# Patient Record
Sex: MALE | Race: WHITE | NOT HISPANIC OR LATINO | Employment: OTHER | URBAN - METROPOLITAN AREA
[De-identification: names, ages, dates, MRNs, and addresses within clinical notes are randomized per-mention and may not be internally consistent; named-entity substitution may affect disease eponyms.]

---

## 2021-05-16 ENCOUNTER — OFFICE VISIT (OUTPATIENT)
Dept: URGENT CARE | Facility: CLINIC | Age: 71
End: 2021-05-16
Payer: MEDICARE

## 2021-05-16 VITALS
TEMPERATURE: 98.3 F | WEIGHT: 231.4 LBS | HEIGHT: 75 IN | DIASTOLIC BLOOD PRESSURE: 82 MMHG | HEART RATE: 80 BPM | OXYGEN SATURATION: 96 % | RESPIRATION RATE: 16 BRPM | BODY MASS INDEX: 28.77 KG/M2 | SYSTOLIC BLOOD PRESSURE: 126 MMHG

## 2021-05-16 DIAGNOSIS — H66.002 NON-RECURRENT ACUTE SUPPURATIVE OTITIS MEDIA OF LEFT EAR WITHOUT SPONTANEOUS RUPTURE OF TYMPANIC MEMBRANE: ICD-10-CM

## 2021-05-16 PROCEDURE — 99204 OFFICE O/P NEW MOD 45 MIN: CPT | Performed by: NURSE PRACTITIONER

## 2021-05-16 RX ORDER — AMOXICILLIN AND CLAVULANATE POTASSIUM 875; 125 MG/1; MG/1
1 TABLET, FILM COATED ORAL 2 TIMES DAILY
Qty: 14 TABLET | Refills: 0 | Status: SHIPPED | OUTPATIENT
Start: 2021-05-16 | End: 2021-05-23

## 2021-05-16 ASSESSMENT — ENCOUNTER SYMPTOMS
DIARRHEA: 0
EYE PAIN: 0
SORE THROAT: 0
VERTIGO: 1
DOUBLE VISION: 0
EYE DISCHARGE: 0
FATIGUE: 1
PALPITATIONS: 0
DIZZINESS: 1
NAUSEA: 0
EYE REDNESS: 0
PHOTOPHOBIA: 0
FEVER: 0
CHILLS: 0
MYALGIAS: 0
HEADACHES: 0
ABDOMINAL PAIN: 0
VOMITING: 0
VISUAL CHANGE: 0
COUGH: 0
BLURRED VISION: 0

## 2021-05-16 NOTE — PROGRESS NOTES
Subjective:     Mariusz Jackson is a 71 y.o. male who presents for Dizziness (started this morning, loss of balance, light headed)      Dizziness  This is a new problem. The current episode started today (Mariusz is a pleasant 71 year old male who presents to  today with complaints of dizziness that started when he awoke this morning. His dizzyness has waxed and waned today. ). The problem occurs constantly. The problem has been unchanged. Associated symptoms include fatigue and vertigo. Pertinent negatives include no abdominal pain, chest pain, chills, congestion, coughing, fever, headaches, myalgias, nausea, sore throat, visual change or vomiting. Exacerbated by: position changes. Treatments tried: 2 Asprin. The treatment provided no relief.         Review of Systems   Constitutional: Positive for fatigue and malaise/fatigue. Negative for chills and fever.   HENT: Negative for congestion and sore throat.    Eyes: Negative for blurred vision, double vision, photophobia, pain, discharge and redness.   Respiratory: Negative for cough.    Cardiovascular: Negative for chest pain and palpitations.   Gastrointestinal: Negative for abdominal pain, diarrhea, nausea and vomiting.   Musculoskeletal: Negative for myalgias.   Neurological: Positive for dizziness and vertigo. Negative for headaches.   Endo/Heme/Allergies: Negative for environmental allergies.       PMH: No past medical history on file.  ALLERGIES: No Known Allergies  SURGHX: No past surgical history on file.  SOCHX:   Social History     Socioeconomic History   • Marital status:      Spouse name: Not on file   • Number of children: Not on file   • Years of education: Not on file   • Highest education level: Not on file   Occupational History   • Not on file   Tobacco Use   • Smoking status: Not on file   Substance and Sexual Activity   • Alcohol use: Not on file   • Drug use: Not on file   • Sexual activity: Not on file   Other Topics Concern   • Not on  "file   Social History Narrative   • Not on file     Social Determinants of Health     Financial Resource Strain:    • Difficulty of Paying Living Expenses:    Food Insecurity:    • Worried About Running Out of Food in the Last Year:    • Ran Out of Food in the Last Year:    Transportation Needs:    • Lack of Transportation (Medical):    • Lack of Transportation (Non-Medical):    Physical Activity:    • Days of Exercise per Week:    • Minutes of Exercise per Session:    Stress:    • Feeling of Stress :    Social Connections:    • Frequency of Communication with Friends and Family:    • Frequency of Social Gatherings with Friends and Family:    • Attends Restoration Services:    • Active Member of Clubs or Organizations:    • Attends Club or Organization Meetings:    • Marital Status:    Intimate Partner Violence:    • Fear of Current or Ex-Partner:    • Emotionally Abused:    • Physically Abused:    • Sexually Abused:      FH: No family history on file.      Objective:   /82 (BP Location: Left arm, Patient Position: Sitting, BP Cuff Size: Adult)   Pulse 80   Temp 36.8 °C (98.3 °F) (Temporal)   Resp 16   Ht 1.905 m (6' 3\")   Wt 105 kg (231 lb 6.4 oz)   SpO2 96%   BMI 28.92 kg/m²     Physical Exam  Vitals and nursing note reviewed.   Constitutional:       General: He is not in acute distress.     Appearance: Normal appearance. He is normal weight. He is not ill-appearing or toxic-appearing.   HENT:      Head: Normocephalic and atraumatic.      Right Ear: Hearing, tympanic membrane, ear canal and external ear normal. There is no impacted cerumen.      Left Ear: Hearing, ear canal and external ear normal. There is impacted cerumen. No mastoid tenderness. Tympanic membrane is erythematous and bulging.      Ears:      Comments: Cerumen removed via lavage by MA.  Internal ear visualized following removal.  TM injected and bulging.  He denies any pain associated with this left ear.      Nose: No congestion or " rhinorrhea.      Mouth/Throat:      Mouth: Mucous membranes are moist.      Pharynx: No oropharyngeal exudate or posterior oropharyngeal erythema.   Eyes:      Extraocular Movements: Extraocular movements intact.      Pupils: Pupils are equal, round, and reactive to light.   Cardiovascular:      Rate and Rhythm: Normal rate and regular rhythm.      Pulses: Normal pulses.      Heart sounds: Normal heart sounds.   Pulmonary:      Effort: Pulmonary effort is normal.      Breath sounds: Normal breath sounds.   Abdominal:      General: Abdomen is flat. Bowel sounds are normal.      Palpations: Abdomen is soft.      Tenderness: There is no abdominal tenderness. There is no right CVA tenderness or left CVA tenderness.   Musculoskeletal:         General: Normal range of motion.      Cervical back: Normal range of motion and neck supple. No tenderness.   Lymphadenopathy:      Cervical: No cervical adenopathy.   Skin:     General: Skin is warm and dry.      Capillary Refill: Capillary refill takes less than 2 seconds.   Neurological:      General: No focal deficit present.      Mental Status: He is alert and oriented to person, place, and time. Mental status is at baseline.   Psychiatric:         Mood and Affect: Mood normal.         Behavior: Behavior normal.         Thought Content: Thought content normal.         Judgment: Judgment normal.         Assessment/Plan:   Assessment    1. Non-recurrent acute suppurative otitis media of left ear without spontaneous rupture of tympanic membrane  amoxicillin-clavulanate (AUGMENTIN) 875-125 MG Tab     Although he denies any pain of his left ear, I believe that his dizziness is associated with middle ear infection.  He was prescribed Augmentin for this infection.  Patient to follow-up for worsening or persistent symptoms.   AVS handout given and reviewed with patient. Pt educated on red flags and when to seek treatment back in ER or UC.

## 2021-05-17 PROBLEM — M54.2 NECK PAIN: Status: ACTIVE | Noted: 2019-04-15

## 2021-05-17 PROBLEM — M79.672 FOOT PAIN, BILATERAL: Status: ACTIVE | Noted: 2017-07-17

## 2021-05-17 PROBLEM — L57.0 AK (ACTINIC KERATOSIS): Status: ACTIVE | Noted: 2020-02-12

## 2021-05-17 PROBLEM — M54.50 ACUTE LEFT-SIDED LOW BACK PAIN WITHOUT SCIATICA: Status: ACTIVE | Noted: 2019-06-19

## 2021-05-17 PROBLEM — M79.671 FOOT PAIN, BILATERAL: Status: ACTIVE | Noted: 2017-07-17

## 2021-08-25 ENCOUNTER — OFFICE VISIT (OUTPATIENT)
Dept: URGENT CARE | Facility: CLINIC | Age: 71
End: 2021-08-25
Payer: MEDICARE

## 2021-08-25 ENCOUNTER — HOSPITAL ENCOUNTER (OUTPATIENT)
Facility: MEDICAL CENTER | Age: 71
End: 2021-08-25
Attending: PHYSICIAN ASSISTANT
Payer: MEDICARE

## 2021-08-25 VITALS
RESPIRATION RATE: 14 BRPM | DIASTOLIC BLOOD PRESSURE: 62 MMHG | WEIGHT: 220 LBS | HEART RATE: 58 BPM | HEIGHT: 75 IN | SYSTOLIC BLOOD PRESSURE: 120 MMHG | TEMPERATURE: 97.7 F | BODY MASS INDEX: 27.35 KG/M2 | OXYGEN SATURATION: 96 %

## 2021-08-25 DIAGNOSIS — A09 TRAVELER'S DIARRHEA: ICD-10-CM

## 2021-08-25 LAB
C DIFF DNA SPEC QL NAA+PROBE: NEGATIVE
C DIFF TOX GENS STL QL NAA+PROBE: NEGATIVE

## 2021-08-25 PROCEDURE — 87177 OVA AND PARASITES SMEARS: CPT

## 2021-08-25 PROCEDURE — 87899 AGENT NOS ASSAY W/OPTIC: CPT

## 2021-08-25 PROCEDURE — 99213 OFFICE O/P EST LOW 20 MIN: CPT | Performed by: PHYSICIAN ASSISTANT

## 2021-08-25 PROCEDURE — 87045 FECES CULTURE AEROBIC BACT: CPT

## 2021-08-25 PROCEDURE — 87493 C DIFF AMPLIFIED PROBE: CPT

## 2021-08-25 PROCEDURE — 87209 SMEAR COMPLEX STAIN: CPT

## 2021-08-25 RX ORDER — ASPIRIN 81 MG/1
81 TABLET ORAL
COMMUNITY
End: 2021-08-25

## 2021-08-25 RX ORDER — AZITHROMYCIN 500 MG/1
500 TABLET, FILM COATED ORAL DAILY
Qty: 3 TABLET | Refills: 0 | Status: SHIPPED | OUTPATIENT
Start: 2021-08-25 | End: 2021-08-28

## 2021-08-25 RX ORDER — IBUPROFEN 200 MG
200 TABLET ORAL
COMMUNITY
End: 2021-08-25

## 2021-08-25 ASSESSMENT — ENCOUNTER SYMPTOMS
WEIGHT LOSS: 0
CHILLS: 0
BLOATING: 0
ABDOMINAL PAIN: 0
BLOOD IN STOOL: 0
DIARRHEA: 1
FEVER: 0
FLATUS: 0

## 2021-08-25 NOTE — PROGRESS NOTES
Subjective     Mariusz Jackson is a 71 y.o. male who presents with Diarrhea (x3-4wks. comes and goes, in last week, had been traveling to [place])    Medications:    • This patient does not have an active medication from one of the medication groupers.    Allergies: Patient has no known allergies.    Problem List: Mariusz Jackson does not have any pertinent problems on file.    Surgical History:  No past surgical history on file.    Past Social Hx: Mariusz Jackson       Past Family Hx:  Mariusz Jackson family history is not on file.     Problem list, medications, and allergies reviewed by myself today in Epic.           Patient presents with:  Diarrhea: x3-4wks. comes and goes, in last week, had been traveling to [place] from August 2-22.  Pt states no fever or bloody diarrhea.  Pt has tried some over-the-counter Imodium, as well as diet changes with no relief of the diarrhea.  Patient denies any other complaint.        Diarrhea   This is a new problem. The current episode started 1 to 4 weeks ago. The problem occurs 2 to 4 times per day. The problem has been waxing and waning. The stool consistency is described as watery. Pertinent negatives include no abdominal pain, bloating, chills, fever, increased  flatus or weight loss. Exacerbated by: all PO intake. Risk factors include travel to endemic area. He has tried bismuth subsalicylate, change of diet, increased fluids and electrolyte solution for the symptoms. The treatment provided no relief. There is no history of bowel resection, inflammatory bowel disease, irritable bowel syndrome, malabsorption, a recent abdominal surgery or short gut syndrome.       Review of Systems   Constitutional: Negative for chills, fever, malaise/fatigue and weight loss.   Gastrointestinal: Positive for diarrhea. Negative for abdominal pain, bloating, blood in stool and flatus.   Skin: Negative for rash.   All other systems reviewed and are negative.             Objective  "    /62 (BP Location: Left arm, Patient Position: Sitting, BP Cuff Size: Large adult)   Pulse (!) 58   Temp 36.5 °C (97.7 °F) (Temporal)   Resp 14   Ht 1.905 m (6' 3\")   Wt 99.8 kg (220 lb)   SpO2 96%   BMI 27.50 kg/m²      Physical Exam  Vitals and nursing note reviewed.   Constitutional:       General: He is not in acute distress.     Appearance: Normal appearance. He is well-developed and normal weight. He is not ill-appearing or toxic-appearing.   HENT:      Head: Normocephalic and atraumatic.      Right Ear: Tympanic membrane normal.      Left Ear: Tympanic membrane normal.      Nose: Nose normal.      Mouth/Throat:      Lips: Pink.      Mouth: Mucous membranes are moist.      Pharynx: Oropharynx is clear. Uvula midline.   Eyes:      Extraocular Movements: Extraocular movements intact.      Conjunctiva/sclera: Conjunctivae normal.      Pupils: Pupils are equal, round, and reactive to light.   Cardiovascular:      Rate and Rhythm: Normal rate and regular rhythm.      Pulses: Normal pulses.      Heart sounds: Normal heart sounds.   Pulmonary:      Effort: Pulmonary effort is normal.      Breath sounds: Normal breath sounds.   Abdominal:      General: Bowel sounds are normal.      Palpations: Abdomen is soft.   Musculoskeletal:         General: Normal range of motion.      Cervical back: Normal range of motion and neck supple.   Skin:     General: Skin is warm and dry.      Capillary Refill: Capillary refill takes less than 2 seconds.   Neurological:      General: No focal deficit present.      Mental Status: He is alert and oriented to person, place, and time.      Cranial Nerves: No cranial nerve deficit.      Motor: Motor function is intact.      Coordination: Coordination is intact.      Gait: Gait normal.   Psychiatric:         Mood and Affect: Mood normal.                   Assessment & Plan             1. Traveler's diarrhea  C Diff by PCR rflx Toxin    Complete O&P    CULTURE STOOL    " azithromycin (ZITHROMAX) 500 MG tablet     Patient was evaluated in clinic today while wearing appropriate personal protective equipment.      Patient has recently traveled to [place], diarrhea began after he started his travels.  Diarrhea is likely traveler's diarrhea which has persisted even on return.    Stool studies ordered, patient will give specimen to the lab prior to starting antibiotic treatment.    Culture sent to lab, will call with any necessary treatment or treatment changes.     PT should follow up with PCP in 1-2 days for re-evaluation if symptoms have not improved.      Discussed red flags and reasons to return to UC or ED.      Pt and/or family verbalized understanding of diagnosis and follow up instructions and was offered informational handout on diagnosis.  PT discharged.

## 2021-08-25 NOTE — PATIENT INSTRUCTIONS
"Travelers' Diarrhea  Travelers' diarrhea (TD) is the most common illness affecting travelers. Each year many travelers develop diarrhea. TD usually occurs within the first week of travel. However, it may occur at any time while traveling. It may even occur after returning home. The most important risk factor is where you are going. High-risk places are the developing countries of:  · [place].   · [place].   · [place].   · [place].   High risk people include young adults and those with:  · Transplants.   · HIV infections.   · Medicine that suppresses the immune system.   · Inflammatory-bowel disease.   · Diabetes.   · H-2 blockers or antacids.   Attack rates are similar for men and women. The primary source of TD is eating or drinking food or water tainted with feces (stool or bowel movements).  CAUSES   Infectious agents are the primary cause of TD. Germs cause almost 80% of TD cases. The most common germ produces:  · Watery diarrhea with cramps.   · Low-grade or no fever.   There are many other bacterial, viral and parasitic pathogens (disease causing \"bugs\").   SYMPTOMS   Most TD cases begin suddenly. Symptoms include stool that is increased in:  · Frequency.   · Volume.   · Weight.   Altered stool consistency also is common. Typically, you have four to five loose or watery bowel movements each day. Other common symptoms are:  · Nausea.   · Vomiting.   · Diarrhea.   · Abdominal cramping.   · Bloating.   · Fever.   · Urgency.   · Malaise.   Most cases are not dangerous. Most cases go away in 1-2 days without treatment. TD is rarely life threatening. 90% of cases resolve within 1 week. 98% resolve within 1 month.  PREVENTION   · Avoid foods or beverages purchased from street vendors in high risk countries.   · Avoid food from places where unclean conditions are present.   · Avoid raw or undercooked meat and seafood.   · Avoid raw fruits (e.g., oranges, bananas, avocados) and vegetables unless you peel them " yourself.   · If handled properly, well-cooked and packaged foods usually are safe. Foods associated with increased risk for TD include:   · Tap water.   · Ice.   · Unpasteurized milk.   · Dairy products.   · Safe beverages include:   · Bottled carbonated beverages.   · Hot tea or coffee.   · Beer.   · Wine.   · Boiled water.   · Water treated with iodine or chlorine.   ANTIBIOTICS ARE NOT RECOMMENDED AS PREVENTION  · CDC (Centers for Disease Control) does not recommend antimicrobial drugs (medicine that kill germs) to prevent TD. Several studies show that Pepto-Bismol® taken as either 2 tablets 4 times daily, or 2 fluid ounces 4 times daily, reduces the incidence of travelers' diarrhea. People that should avoid Pepto-Bismol® include those who are:   · Pregnant.   · Allergic to aspirin.   · Taking anticoagulants medicine (probenecid, methotrexate).   · Be informed about potential side effects, in particular about temporary blackening of the tongue and stool, and rarely ringing in the ears. Because of potential adverse side effects, preventative Pepto-Bismol® should not be used for more than 3 weeks.   · Some antibiotics taken in a once-a-day dose are 90% effective at preventing travelers' diarrhea. However, antibiotics are not recommended as prevention. Routine antimicrobial prophylaxis increases your risk for:   · Adverse reactions.   · Infections with resistant organisms.   · Antibiotics can increase your susceptibility to resistant bacterial pathogens and provide no protection against either viral or parasitic pathogens. This can give travelers a false sense of security. As a result, strict adherence to preventive measures is encouraged. Pepto-Bismol® should be used as an extra effort if prophylaxis is needed.   TREATMENT   · TD usually is a self-limited disorder. It gets well without treatment. It often goes away without specific treatment. Oral re-hydration is often helpful to replace lost fluids and  electrolytes. Clear liquids are routinely recommended for adults. You may be helped with antimicrobial therapy if you develop three or more loose stools in an 8-hour period, especially if associated with:   · Nausea.   · Vomiting.   · Abdominal cramps.   · Fever.   · Blood in stools.   · Antibiotics usually are given for 3-5 days. Pepto-Bismol® also may be used as treatment. Take one fluid ounce, or two 262 mg tablets every 30 minutes, for up to 8 doses in a 24-hour period. This can be repeated on a second day. If diarrhea persists despite therapy, you should be evaluated by a caregiver and treated for possible parasitic infection.   · Because drug resistance is a continuing problem and may vary from country to country, professional assistance should be looked for if problems persist.   · Antimotility agents (loperamide, diphenoxylate, and paregoric) mostly reduce diarrhea by slowing down the passage of food and drink in the gut. This allows more time for absorption. Some persons believe diarrhea is the body's defense mechanism to minimize contact time between gut pathogens and lining of the bowel. In several studies, antimotility agents have been useful in treating travelers' diarrhea by decreasing the duration of diarrhea. However, these agents should never be used by persons with fever or bloody diarrhea because they can increase the severity of disease by delaying clearance of causative organisms. Because antimotility agents are now available over the counter, their improper use is of concern. Complications have been reported from the use of these medicines such as:   · Toxic megacolon.   · Sepsis.   · Disseminated intravascular coagulation.   SEEK IMMEDIATE MEDICAL CARE IF:   · You are unable to keep fluids down.   · Vomiting or diarrhea becomes persistent.   · Abdominal (belly) pain develops or increases or localizes. (Right sided pain can be appendicitis and left sided pain in adults can be diverticulitis).    · You develop an oral temperature above 102° F (38.9° C), or as your caregiver suggests.   · Diarrhea becomes excessive or contains blood or mucous.   · Excessive weakness, dizziness, fainting or extreme thirst.   · Checking weight 2 to 3 times per day in babies and children will help verify adequate fluid replacement. Your caregiver will tell you what loss should concern you or suggest another visit to your personal physician.   · Record your weight or your child's weight today. Compare this to your home scale and record all weights and time and date weighed. Try to check weight at the same times every day. Bring this chart to your caregivers if you or your child needs to be seen again.   FOR MORE INFORMATION   Travelers should consult with a caregiver before departing on a trip abroad. Information about TD is available from:  · Your local or state health departments.   · World Health Organization (WHO).   Other information that may be of interest to travelers can be found at the CDC Travelers' Health homepage at http://www.cdc.gov/travel.  Document Released: 12/08/2003 Document Revised: 03/11/2013 Document Reviewed: 02/25/2010  [employer]Care® Patient Information ©2013 [employer].

## 2021-08-26 LAB
E COLI SXT1+2 STL IA: NORMAL
SIGNIFICANT IND 70042: NORMAL
SITE SITE: NORMAL
SOURCE SOURCE: NORMAL

## 2021-08-27 LAB
BACTERIA STL CULT: NORMAL
C JEJUNI+C COLI AG STL QL: NORMAL
E COLI SXT1+2 STL IA: NORMAL
SIGNIFICANT IND 70042: NORMAL
SITE SITE: NORMAL
SOURCE SOURCE: NORMAL

## 2021-08-30 LAB — OVA AND PARASITE, FECAL INTERPRETATION Q0595: NEGATIVE

## 2021-10-14 ENCOUNTER — TELEPHONE (OUTPATIENT)
Dept: SCHEDULING | Facility: IMAGING CENTER | Age: 71
End: 2021-10-14

## 2021-10-19 ENCOUNTER — OFFICE VISIT (OUTPATIENT)
Dept: MEDICAL GROUP | Facility: PHYSICIAN GROUP | Age: 71
End: 2021-10-19
Payer: MEDICARE

## 2021-10-19 VITALS
OXYGEN SATURATION: 96 % | TEMPERATURE: 98.8 F | RESPIRATION RATE: 16 BRPM | WEIGHT: 226 LBS | HEIGHT: 75 IN | HEART RATE: 60 BPM | SYSTOLIC BLOOD PRESSURE: 114 MMHG | BODY MASS INDEX: 28.1 KG/M2 | DIASTOLIC BLOOD PRESSURE: 70 MMHG

## 2021-10-19 DIAGNOSIS — Z23 NEED FOR VACCINATION: ICD-10-CM

## 2021-10-19 DIAGNOSIS — M79.672 FOOT PAIN, BILATERAL: ICD-10-CM

## 2021-10-19 DIAGNOSIS — M79.671 FOOT PAIN, BILATERAL: ICD-10-CM

## 2021-10-19 DIAGNOSIS — L57.0 AK (ACTINIC KERATOSIS): ICD-10-CM

## 2021-10-19 DIAGNOSIS — Z00.00 HEALTHCARE MAINTENANCE: ICD-10-CM

## 2021-10-19 PROBLEM — Z98.890 HISTORY OF LAMINECTOMY: Status: ACTIVE | Noted: 2021-10-19

## 2021-10-19 PROCEDURE — G0008 ADMIN INFLUENZA VIRUS VAC: HCPCS | Performed by: STUDENT IN AN ORGANIZED HEALTH CARE EDUCATION/TRAINING PROGRAM

## 2021-10-19 PROCEDURE — 99204 OFFICE O/P NEW MOD 45 MIN: CPT | Mod: 25 | Performed by: STUDENT IN AN ORGANIZED HEALTH CARE EDUCATION/TRAINING PROGRAM

## 2021-10-19 PROCEDURE — 90662 IIV NO PRSV INCREASED AG IM: CPT | Performed by: STUDENT IN AN ORGANIZED HEALTH CARE EDUCATION/TRAINING PROGRAM

## 2021-10-19 ASSESSMENT — PATIENT HEALTH QUESTIONNAIRE - PHQ9: CLINICAL INTERPRETATION OF PHQ2 SCORE: 0

## 2021-10-19 NOTE — LETTER
[facility]  Celio Santiago P.A.-C.  [address]  [place] [address]  Fax: [phone]   Authorization for Release/Disclosure of   Protected Health Information   Name: BASSAM TO : 1950 SSN: [id number]   Address: [facility] [address] [place] [address] Phone:    [phone] (home) [phone] (work)   I authorize the entity listed below to release/disclose the PHI below to:   [facility]/Celio Santiago P.A.-C. and Celio Santiago P.A.-C.   Provider or Entity Name:  [facility]   Address   City, [facility] [address], [facility], [place]  Phone:      Fax:     Reason for request: continuity of care   Information to be released:    [  ] LAST COLONOSCOPY,  including any PATH REPORT and follow-up  [  ] LAST FIT/COLOGUARD RESULT [  ] LAST DEXA  [  ] LAST MAMMOGRAM  [  ] LAST PAP  [  ] LAST LABS [  ] RETINA EXAM REPORT  [  ] IMMUNIZATION RECORDS  [XX] Release all info      [  ] Check here and initial the line next to each item to release ALL health information INCLUDING  _____ Care and treatment for drug and / or alcohol abuse  _____ HIV testing, infection status, or AIDS  _____ Genetic Testing    DATES OF SERVICE OR TIME PERIOD TO BE DISCLOSED: _____________  I understand and acknowledge that:  * This Authorization may be revoked at any time by you in writing, except if your health information has already been used or disclosed.  * Your health information that will be used or disclosed as a result of you signing this authorization could be re-disclosed by the recipient. If this occurs, your re-disclosed health information may no longer be protected by State or Federal laws.  * You may refuse to sign this Authorization. Your refusal will not affect your ability to obtain treatment.  * This Authorization becomes effective upon signing and will  on (date) __________.      If no date is indicated, this Authorization will  one (1) year from the signature date.    Name: Bassam  Abner Jackson    Signature:   Date:     10/19/2021       PLEASE FAX REQUESTED RECORDS BACK TO: [phone]

## 2021-10-19 NOTE — LETTER
[facility]  Celio Santiago P.A.-C.  [address]  [place] [address]  Fax: [phone]   Authorization for Release/Disclosure of   Protected Health Information   Name: BASSAM TO : 1950 SSN: [id number]   Address: [facility] [address] [place] [address] Phone:    [phone] (home) [phone] (work)   I authorize the entity listed below to release/disclose the PHI below to:   [facility]Celio Santiago P.A.-C. and Celio Santiago P.A.-C.   Provider or Entity Name:     Address   City, State, Zip   Phone:      Fax:     Reason for request: continuity of care   Information to be released:    [  ] LAST COLONOSCOPY,  including any PATH REPORT and follow-up  [  ] LAST FIT/COLOGUARD RESULT [  ] LAST DEXA  [  ] LAST MAMMOGRAM  [  ] LAST PAP  [  ] LAST LABS [  ] RETINA EXAM REPORT  [  ] IMMUNIZATION RECORDS  [  ] Release all info      [  ] Check here and initial the line next to each item to release ALL health information INCLUDING  _____ Care and treatment for drug and / or alcohol abuse  _____ HIV testing, infection status, or AIDS  _____ Genetic Testing    DATES OF SERVICE OR TIME PERIOD TO BE DISCLOSED: _____________  I understand and acknowledge that:  * This Authorization may be revoked at any time by you in writing, except if your health information has already been used or disclosed.  * Your health information that will be used or disclosed as a result of you signing this authorization could be re-disclosed by the recipient. If this occurs, your re-disclosed health information may no longer be protected by State or Federal laws.  * You may refuse to sign this Authorization. Your refusal will not affect your ability to obtain treatment.  * This Authorization becomes effective upon signing and will  on (date) __________.      If no date is indicated, this Authorization will  one (1) year from the signature date.    Name: Bassam To    Signature:   Date:      10/19/2021       PLEASE FAX REQUESTED RECORDS BACK TO: [phone]

## 2021-10-19 NOTE — LETTER
[facility]  Celio Santiago P.A.-C.  [address]  [place] [address]  Fax: [phone]   Authorization for Release/Disclosure of   Protected Health Information   Name: BASSAM TO : 1950 SSN: [id number]   Address[facility] [address] [place] [address] Phone:    [phone] (home) [phone] (work)   I authorize the entity listed below to release/disclose the PHI below to:   [facility]/Celio Santiago P.A.-C. and Celio Santiago P.A.-C.   Provider or Entity Name:     Address   City, State, Zip   Phone:      Fax:     Reason for request: continuity of care   Information to be released:    [  ] LAST COLONOSCOPY,  including any PATH REPORT and follow-up  [  ] LAST FIT/COLOGUARD RESULT [  ] LAST DEXA  [  ] LAST MAMMOGRAM  [  ] LAST PAP  [  ] LAST LABS [  ] RETINA EXAM REPORT  [  ] IMMUNIZATION RECORDS  [  ] Release all info      [  ] Check here and initial the line next to each item to release ALL health information INCLUDING  _____ Care and treatment for drug and / or alcohol abuse  _____ HIV testing, infection status, or AIDS  _____ Genetic Testing    DATES OF SERVICE OR TIME PERIOD TO BE DISCLOSED: _____________  I understand and acknowledge that:  * This Authorization may be revoked at any time by you in writing, except if your health information has already been used or disclosed.  * Your health information that will be used or disclosed as a result of you signing this authorization could be re-disclosed by the recipient. If this occurs, your re-disclosed health information may no longer be protected by State or Federal laws.  * You may refuse to sign this Authorization. Your refusal will not affect your ability to obtain treatment.  * This Authorization becomes effective upon signing and will  on (date) __________.      If no date is indicated, this Authorization will  one (1) year from the signature date.    Name: Bassam To    Signature:   Date:      10/19/2021       PLEASE FAX REQUESTED RECORDS BACK TO: [phone]

## 2021-10-19 NOTE — PROGRESS NOTES
"Subjective:     CC:  Diagnoses of Need for vaccination, AK (actinic keratosis), Foot pain, bilateral, and Healthcare maintenance were pertinent to this visit.    HISTORY OF THE PRESENT ILLNESS: Patient is a 71 y.o. male. This pleasant patient is here today to establish care. His prior PCP was  Kirill Harvey MD.    1.  Actinic keratosis  He was previously under care of dermatology for actinic plaques and concerns for skin cancer.  The patient notes that he comes from a very fair skinned family and there is a strong family history of skin cancer.    2.  Bilateral foot pain  Longstanding.  He has needed the assistance of podiatry in the past for surgical removal of corns.  He does have recurring corns that cause him pain with walking.    Active Diagnosis:    Patient Active Problem List   Diagnosis   • Acute left-sided low back pain without sciatica   • AK (actinic keratosis)   • Encounter for health-related screening   • Foot pain, bilateral   • Neck pain   • History of laminectomy      No current Epic-ordered outpatient medications on file.     No current Epic-ordered facility-administered medications on file.     ROS:   Gen: No fevers/chills, no changes in weight  HEENT: No changes in vision/hearing, sore throat.  Pulm: No cough, unexplained SOB.  CV: No chest pain/pressure, no palpitations  GI: No nausea/vomiting, no diarrhea  : No dysuria/nocturia  MSk: No myalgias  Skin: No rash/skin changes  Neuro: No headaches, no numbness/tingling  Heme/Lymph: no easy bruising      Objective:     Exam: /70 (BP Location: Left arm, Patient Position: Sitting, BP Cuff Size: Adult)   Pulse 60   Temp 37.1 °C (98.8 °F) (Temporal)   Resp 16   Ht 1.905 m (6' 3\")   Wt 103 kg (226 lb)   SpO2 96%  Body mass index is 28.25 kg/m².    General: Normal appearing. No distress.  HEENT: Normocephalic. Eyes conjunctiva clear lids without ptosis, pupils equal and reactive to light accommodation. Ears normal shape and contour, canals are " clear bilaterally, tympanic membranes are benign, nasal mucosa benign, oropharynx is without erythema, edema or exudates.   Neck: Supple without JVD or bruit. Thyroid is not enlarged.  Pulmonary: Clear to ausculation.  Normal effort. No rales, ronchi, or wheezing.  Cardiovascular: Regular rate and rhythm without murmur. Radial pulses are intact and equal bilaterally.  Abdomen: Soft, nontender, nondistended. Normal bowel sounds. Liver and spleen are not palpable  Neurologic: Grossly nonfocal.  CN II through XII intact.  Lymph: No cervical or supraclavicular lymph nodes are palpable  Skin: Warm and dry.  No obvious lesions.  Musculoskeletal: Normal gait. No extremity cyanosis, clubbing, or edema.  Psych: Normal mood and affect. Alert and oriented x3. Judgment and insight are normal.    A chaperone was offered to the patient during today's exam. Patient declined chaperone.    Labs:   No recent labs available.    Assessment & Plan:   71 y.o. male with the following -    1. Need for vaccination  - Provided in clinic today  - INFLUENZA VACCINE, HIGH DOSE (65+ ONLY)    2. AK (actinic keratosis)  - Chronic.  - REFERRAL TO DERMATOLOGY    3.  Bilateral foot pain  -Chronic, due to recurring corns.  -Refer to podiatry.    4.  Healthcare maintenance  -We discussed diet/exercise/healthy weight maintenance.  We discussed the need for biannual dentistry visits.  We discussed the need to always wear a seatbelt.  -CBC, CMP, hep C screen, lipid panel.      Return in about 6 months (around 4/19/2022) for Medicare Wellness.    Please note that this dictation was created using voice recognition software. I have made every reasonable attempt to correct obvious errors, but I expect that there are errors of grammar and possibly content that I did not discover before finalizing the note.      Celio Santiago PA-C 10/19/2021

## 2021-10-19 NOTE — LETTER
[facility]  Celio Santiago P.A.-C.  [address]  [place] [address]  Fax: [phone]   Authorization for Release/Disclosure of   Protected Health Information   Name: BASSAM TO : 1950 SSN: [id number]   Address: [facility] [address] [place] [address] Phone:    [phone] (home) [phone] (work)   I authorize the entity listed below to release/disclose the PHI below to:   [facility]/Celio Santiago P.A.-C. and Celio Santiago P.A.-C.   Provider or Entity Name:     Address   City, State, Zip   Phone:      Fax:     Reason for request: continuity of care   Information to be released:    [  ] LAST COLONOSCOPY,  including any PATH REPORT and follow-up  [  ] LAST FIT/COLOGUARD RESULT [  ] LAST DEXA  [  ] LAST MAMMOGRAM  [  ] LAST PAP  [  ] LAST LABS [  ] RETINA EXAM REPORT  [  ] IMMUNIZATION RECORDS  [  ] Release all info      [  ] Check here and initial the line next to each item to release ALL health information INCLUDING  _____ Care and treatment for drug and / or alcohol abuse  _____ HIV testing, infection status, or AIDS  _____ Genetic Testing    DATES OF SERVICE OR TIME PERIOD TO BE DISCLOSED: _____________  I understand and acknowledge that:  * This Authorization may be revoked at any time by you in writing, except if your health information has already been used or disclosed.  * Your health information that will be used or disclosed as a result of you signing this authorization could be re-disclosed by the recipient. If this occurs, your re-disclosed health information may no longer be protected by State or Federal laws.  * You may refuse to sign this Authorization. Your refusal will not affect your ability to obtain treatment.  * This Authorization becomes effective upon signing and will  on (date) __________.      If no date is indicated, this Authorization will  one (1) year from the signature date.    Name: Bassam To    Signature:   Date:      10/19/2021       PLEASE FAX REQUESTED RECORDS BACK TO: [phone]

## 2021-10-19 NOTE — LETTER
[facility]  Celio Santiago P.A.-C.  [address]  [place] [address]  Fax: [phone]   Authorization for Release/Disclosure of   Protected Health Information   Name: BASSAM TO : 1950 SSN: [id number]   Address: [facility] [address] [place] [address] Phone:    [phone] (home) [phone] (work)   I authorize the entity listed below to release/disclose the PHI below to:   [facility]/Celio Santiago P.A.-C. and Celio Santiago P.A.-C.   Provider or Entity Name:     Address   City, State, Zip   Phone:      Fax:     Reason for request: continuity of care   Information to be released:    [  ] LAST COLONOSCOPY,  including any PATH REPORT and follow-up  [  ] LAST FIT/COLOGUARD RESULT [  ] LAST DEXA  [  ] LAST MAMMOGRAM  [  ] LAST PAP  [  ] LAST LABS [  ] RETINA EXAM REPORT  [  ] IMMUNIZATION RECORDS  [  ] Release all info      [  ] Check here and initial the line next to each item to release ALL health information INCLUDING  _____ Care and treatment for drug and / or alcohol abuse  _____ HIV testing, infection status, or AIDS  _____ Genetic Testing    DATES OF SERVICE OR TIME PERIOD TO BE DISCLOSED: _____________  I understand and acknowledge that:  * This Authorization may be revoked at any time by you in writing, except if your health information has already been used or disclosed.  * Your health information that will be used or disclosed as a result of you signing this authorization could be re-disclosed by the recipient. If this occurs, your re-disclosed health information may no longer be protected by State or Federal laws.  * You may refuse to sign this Authorization. Your refusal will not affect your ability to obtain treatment.  * This Authorization becomes effective upon signing and will  on (date) __________.      If no date is indicated, this Authorization will  one (1) year from the signature date.    Name: Bassam To    Signature:   Date:      10/19/2021       PLEASE FAX REQUESTED RECORDS BACK TO: [phone]

## 2022-03-07 ENCOUNTER — HOSPITAL ENCOUNTER (OUTPATIENT)
Dept: LAB | Facility: MEDICAL CENTER | Age: 72
End: 2022-03-07
Attending: STUDENT IN AN ORGANIZED HEALTH CARE EDUCATION/TRAINING PROGRAM
Payer: COMMERCIAL

## 2022-03-07 ENCOUNTER — TELEPHONE (OUTPATIENT)
Dept: MEDICAL GROUP | Facility: PHYSICIAN GROUP | Age: 72
End: 2022-03-07

## 2022-03-07 DIAGNOSIS — Z00.00 HEALTHCARE MAINTENANCE: ICD-10-CM

## 2022-03-07 NOTE — TELEPHONE ENCOUNTER
VOICEMAIL  1. Caller Name: Mariusz Jackson                      Call Back Number: [phone]    2. Message: Pt left vm stating his lab tests ordered by PCP the codes were not covered by Medicare and pt is requesting PCP to change codes so he can get his labs completed     3. Patient approves office to leave a detailed voicemail/MyChart message: N\A

## 2022-03-09 ENCOUNTER — TELEPHONE (OUTPATIENT)
Dept: MEDICAL GROUP | Facility: PHYSICIAN GROUP | Age: 72
End: 2022-03-09
Payer: COMMERCIAL

## 2022-03-09 NOTE — TELEPHONE ENCOUNTER
This will have to wait for Celio's return. There are no open orders, so I don't know what labs to change ICD-10 codes on.

## 2022-03-17 DIAGNOSIS — Z11.59 ENCOUNTER FOR HEPATITIS C SCREENING TEST FOR LOW RISK PATIENT: ICD-10-CM

## 2022-03-17 DIAGNOSIS — Z13.220 SCREENING FOR HYPERCHOLESTEROLEMIA: ICD-10-CM

## 2022-03-17 DIAGNOSIS — Z00.00 HEALTHCARE MAINTENANCE: ICD-10-CM

## 2022-03-17 DIAGNOSIS — Z13.0 SCREENING FOR DEFICIENCY ANEMIA: ICD-10-CM

## 2022-03-17 DIAGNOSIS — Z13.1 SCREENING FOR DIABETES MELLITUS: ICD-10-CM

## 2022-03-17 DIAGNOSIS — Z13.9 ENCOUNTER FOR HEALTH-RELATED SCREENING: ICD-10-CM

## 2022-03-22 ENCOUNTER — HOSPITAL ENCOUNTER (OUTPATIENT)
Dept: LAB | Facility: MEDICAL CENTER | Age: 72
End: 2022-03-22
Attending: STUDENT IN AN ORGANIZED HEALTH CARE EDUCATION/TRAINING PROGRAM
Payer: MEDICARE

## 2022-03-22 DIAGNOSIS — Z13.1 SCREENING FOR DIABETES MELLITUS: ICD-10-CM

## 2022-03-22 DIAGNOSIS — Z13.9 ENCOUNTER FOR HEALTH-RELATED SCREENING: ICD-10-CM

## 2022-03-22 DIAGNOSIS — Z13.220 SCREENING FOR HYPERCHOLESTEROLEMIA: ICD-10-CM

## 2022-03-22 DIAGNOSIS — Z13.0 SCREENING FOR DEFICIENCY ANEMIA: ICD-10-CM

## 2022-03-22 DIAGNOSIS — Z00.00 HEALTHCARE MAINTENANCE: ICD-10-CM

## 2022-03-22 DIAGNOSIS — Z11.59 ENCOUNTER FOR HEPATITIS C SCREENING TEST FOR LOW RISK PATIENT: ICD-10-CM

## 2022-03-22 LAB
ALBUMIN SERPL BCP-MCNC: 4.6 G/DL (ref 3.2–4.9)
ALBUMIN/GLOB SERPL: 1.8 G/DL
ALP SERPL-CCNC: 53 U/L (ref 30–99)
ALT SERPL-CCNC: 12 U/L (ref 2–50)
ANION GAP SERPL CALC-SCNC: 12 MMOL/L (ref 7–16)
AST SERPL-CCNC: 20 U/L (ref 12–45)
BILIRUB SERPL-MCNC: 0.5 MG/DL (ref 0.1–1.5)
BUN SERPL-MCNC: 18 MG/DL (ref 8–22)
CALCIUM SERPL-MCNC: 9.4 MG/DL (ref 8.5–10.5)
CHLORIDE SERPL-SCNC: 102 MMOL/L (ref 96–112)
CHOLEST SERPL-MCNC: 216 MG/DL (ref 100–199)
CO2 SERPL-SCNC: 26 MMOL/L (ref 20–33)
CREAT SERPL-MCNC: 0.88 MG/DL (ref 0.5–1.4)
ERYTHROCYTE [DISTWIDTH] IN BLOOD BY AUTOMATED COUNT: 45.7 FL (ref 35.9–50)
FASTING STATUS PATIENT QL REPORTED: NORMAL
GFR SERPLBLD CREATININE-BSD FMLA CKD-EPI: 90 ML/MIN/1.73 M 2
GLOBULIN SER CALC-MCNC: 2.5 G/DL (ref 1.9–3.5)
GLUCOSE SERPL-MCNC: 94 MG/DL (ref 65–99)
HCT VFR BLD AUTO: 44.8 % (ref 42–52)
HCV AB SER QL: NORMAL
HDLC SERPL-MCNC: 71 MG/DL
HGB BLD-MCNC: 14.7 G/DL (ref 14–18)
LDLC SERPL CALC-MCNC: 128 MG/DL
MCH RBC QN AUTO: 32.1 PG (ref 27–33)
MCHC RBC AUTO-ENTMCNC: 32.8 G/DL (ref 33.7–35.3)
MCV RBC AUTO: 97.8 FL (ref 81.4–97.8)
PLATELET # BLD AUTO: 184 K/UL (ref 164–446)
PMV BLD AUTO: 10.5 FL (ref 9–12.9)
POTASSIUM SERPL-SCNC: 4.9 MMOL/L (ref 3.6–5.5)
PROT SERPL-MCNC: 7.1 G/DL (ref 6–8.2)
RBC # BLD AUTO: 4.58 M/UL (ref 4.7–6.1)
SODIUM SERPL-SCNC: 140 MMOL/L (ref 135–145)
TRIGL SERPL-MCNC: 87 MG/DL (ref 0–149)
WBC # BLD AUTO: 5.6 K/UL (ref 4.8–10.8)

## 2022-03-22 PROCEDURE — 36415 COLL VENOUS BLD VENIPUNCTURE: CPT | Mod: GA

## 2022-03-22 PROCEDURE — 80053 COMPREHEN METABOLIC PANEL: CPT

## 2022-03-22 PROCEDURE — 85027 COMPLETE CBC AUTOMATED: CPT | Mod: GA

## 2022-03-22 PROCEDURE — 80061 LIPID PANEL: CPT | Mod: GA

## 2022-03-22 PROCEDURE — G0472 HEP C SCREEN HIGH RISK/OTHER: HCPCS | Mod: GA

## 2022-04-16 SDOH — HEALTH STABILITY: PHYSICAL HEALTH: ON AVERAGE, HOW MANY DAYS PER WEEK DO YOU ENGAGE IN MODERATE TO STRENUOUS EXERCISE (LIKE A BRISK WALK)?: 3 DAYS

## 2022-04-16 SDOH — HEALTH STABILITY: MENTAL HEALTH
STRESS IS WHEN SOMEONE FEELS TENSE, NERVOUS, ANXIOUS, OR CAN'T SLEEP AT NIGHT BECAUSE THEIR MIND IS TROUBLED. HOW STRESSED ARE YOU?: TO SOME EXTENT

## 2022-04-16 SDOH — ECONOMIC STABILITY: FOOD INSECURITY: WITHIN THE PAST 12 MONTHS, YOU WORRIED THAT YOUR FOOD WOULD RUN OUT BEFORE YOU GOT MONEY TO BUY MORE.: NEVER TRUE

## 2022-04-16 SDOH — ECONOMIC STABILITY: TRANSPORTATION INSECURITY
IN THE PAST 12 MONTHS, HAS THE LACK OF TRANSPORTATION KEPT YOU FROM MEDICAL APPOINTMENTS OR FROM GETTING MEDICATIONS?: NO

## 2022-04-16 SDOH — ECONOMIC STABILITY: INCOME INSECURITY: IN THE LAST 12 MONTHS, WAS THERE A TIME WHEN YOU WERE NOT ABLE TO PAY THE MORTGAGE OR RENT ON TIME?: NO

## 2022-04-16 SDOH — ECONOMIC STABILITY: TRANSPORTATION INSECURITY
IN THE PAST 12 MONTHS, HAS LACK OF TRANSPORTATION KEPT YOU FROM MEETINGS, WORK, OR FROM GETTING THINGS NEEDED FOR DAILY LIVING?: NO

## 2022-04-16 SDOH — ECONOMIC STABILITY: HOUSING INSECURITY
IN THE LAST 12 MONTHS, WAS THERE A TIME WHEN YOU DID NOT HAVE A STEADY PLACE TO SLEEP OR SLEPT IN A SHELTER (INCLUDING NOW)?: NO

## 2022-04-16 SDOH — ECONOMIC STABILITY: FOOD INSECURITY: WITHIN THE PAST 12 MONTHS, THE FOOD YOU BOUGHT JUST DIDN'T LAST AND YOU DIDN'T HAVE MONEY TO GET MORE.: NEVER TRUE

## 2022-04-16 SDOH — ECONOMIC STABILITY: TRANSPORTATION INSECURITY
IN THE PAST 12 MONTHS, HAS LACK OF RELIABLE TRANSPORTATION KEPT YOU FROM MEDICAL APPOINTMENTS, MEETINGS, WORK OR FROM GETTING THINGS NEEDED FOR DAILY LIVING?: NO

## 2022-04-16 SDOH — ECONOMIC STABILITY: INCOME INSECURITY: HOW HARD IS IT FOR YOU TO PAY FOR THE VERY BASICS LIKE FOOD, HOUSING, MEDICAL CARE, AND HEATING?: NOT HARD AT ALL

## 2022-04-16 SDOH — ECONOMIC STABILITY: HOUSING INSECURITY: IN THE LAST 12 MONTHS, HOW MANY PLACES HAVE YOU LIVED?: 1

## 2022-04-16 SDOH — HEALTH STABILITY: PHYSICAL HEALTH: ON AVERAGE, HOW MANY MINUTES DO YOU ENGAGE IN EXERCISE AT THIS LEVEL?: 30 MIN

## 2022-04-16 ASSESSMENT — LIFESTYLE VARIABLES
HOW OFTEN DO YOU HAVE SIX OR MORE DRINKS ON ONE OCCASION: NEVER
HOW OFTEN DO YOU HAVE A DRINK CONTAINING ALCOHOL: 2-4 TIMES A MONTH
HOW MANY STANDARD DRINKS CONTAINING ALCOHOL DO YOU HAVE ON A TYPICAL DAY: 1 OR 2

## 2022-04-16 ASSESSMENT — SOCIAL DETERMINANTS OF HEALTH (SDOH)
HOW OFTEN DO YOU ATTENT MEETINGS OF THE CLUB OR ORGANIZATION YOU BELONG TO?: NEVER
IN A TYPICAL WEEK, HOW MANY TIMES DO YOU TALK ON THE PHONE WITH FAMILY, FRIENDS, OR NEIGHBORS?: MORE THAN THREE TIMES A WEEK
HOW OFTEN DO YOU HAVE A DRINK CONTAINING ALCOHOL: 2-4 TIMES A MONTH
HOW MANY DRINKS CONTAINING ALCOHOL DO YOU HAVE ON A TYPICAL DAY WHEN YOU ARE DRINKING: 1 OR 2
HOW OFTEN DO YOU GET TOGETHER WITH FRIENDS OR RELATIVES?: TWICE A WEEK
HOW OFTEN DO YOU GET TOGETHER WITH FRIENDS OR RELATIVES?: TWICE A WEEK
DO YOU BELONG TO ANY CLUBS OR ORGANIZATIONS SUCH AS CHURCH GROUPS UNIONS, FRATERNAL OR ATHLETIC GROUPS, OR SCHOOL GROUPS?: NO
IN A TYPICAL WEEK, HOW MANY TIMES DO YOU TALK ON THE PHONE WITH FAMILY, FRIENDS, OR NEIGHBORS?: MORE THAN THREE TIMES A WEEK
WITHIN THE PAST 12 MONTHS, YOU WORRIED THAT YOUR FOOD WOULD RUN OUT BEFORE YOU GOT THE MONEY TO BUY MORE: NEVER TRUE
HOW HARD IS IT FOR YOU TO PAY FOR THE VERY BASICS LIKE FOOD, HOUSING, MEDICAL CARE, AND HEATING?: NOT HARD AT ALL
HOW OFTEN DO YOU HAVE SIX OR MORE DRINKS ON ONE OCCASION: NEVER
HOW OFTEN DO YOU ATTEND CHURCH OR RELIGIOUS SERVICES?: NEVER
HOW OFTEN DO YOU ATTEND CHURCH OR RELIGIOUS SERVICES?: NEVER
DO YOU BELONG TO ANY CLUBS OR ORGANIZATIONS SUCH AS CHURCH GROUPS UNIONS, FRATERNAL OR ATHLETIC GROUPS, OR SCHOOL GROUPS?: NO
HOW OFTEN DO YOU ATTENT MEETINGS OF THE CLUB OR ORGANIZATION YOU BELONG TO?: NEVER

## 2022-04-19 ENCOUNTER — OFFICE VISIT (OUTPATIENT)
Dept: MEDICAL GROUP | Facility: PHYSICIAN GROUP | Age: 72
End: 2022-04-19
Payer: MEDICARE

## 2022-04-19 VITALS
RESPIRATION RATE: 16 BRPM | DIASTOLIC BLOOD PRESSURE: 66 MMHG | OXYGEN SATURATION: 95 % | HEART RATE: 75 BPM | BODY MASS INDEX: 28.55 KG/M2 | HEIGHT: 75 IN | SYSTOLIC BLOOD PRESSURE: 138 MMHG | WEIGHT: 229.6 LBS | TEMPERATURE: 97.8 F

## 2022-04-19 DIAGNOSIS — E78.00 HYPERCHOLESTEROLEMIA: ICD-10-CM

## 2022-04-19 DIAGNOSIS — N52.8 OTHER MALE ERECTILE DYSFUNCTION: ICD-10-CM

## 2022-04-19 PROCEDURE — G0439 PPPS, SUBSEQ VISIT: HCPCS | Performed by: STUDENT IN AN ORGANIZED HEALTH CARE EDUCATION/TRAINING PROGRAM

## 2022-04-19 RX ORDER — TADALAFIL 10 MG/1
10 TABLET ORAL PRN
Qty: 10 TABLET | Refills: 3 | Status: SHIPPED | OUTPATIENT
Start: 2022-04-19 | End: 2023-07-12

## 2022-04-19 ASSESSMENT — ENCOUNTER SYMPTOMS: GENERAL WELL-BEING: GOOD

## 2022-04-19 ASSESSMENT — ACTIVITIES OF DAILY LIVING (ADL): BATHING_REQUIRES_ASSISTANCE: 0

## 2022-04-19 ASSESSMENT — FIBROSIS 4 INDEX: FIB4 SCORE: 2.32

## 2022-04-19 ASSESSMENT — PATIENT HEALTH QUESTIONNAIRE - PHQ9: CLINICAL INTERPRETATION OF PHQ2 SCORE: 0

## 2022-04-19 NOTE — PROGRESS NOTES
Chief Complaint   Patient presents with   • Medicare Annual Wellness   Patient also complains of erectile dysfunction and is here for follow-up for elevated lipid profile.    HPI:  Mariusz is a 72 y.o. here for Medicare Annual Wellness Visit.    1.  Erectile dysfunction  Patient has a longstanding history of erectile dysfunction for which she has never sought any treatment.    2.  Hypercholesterolemia  This was discovered on the patient's last routine labs with an ASCVD score 17.7%.  He is here today to discuss his options.    Patient Active Problem List    Diagnosis Date Noted   • Other male erectile dysfunction 04/19/2022   • Hypercholesterolemia 04/19/2022   • History of laminectomy 10/19/2021   • AK (actinic keratosis) 02/12/2020   • Acute left-sided low back pain without sciatica 06/19/2019   • Neck pain 04/15/2019   • Foot pain, bilateral 07/17/2017   • Encounter for health-related screening 08/31/2016       Current Outpatient Medications   Medication Sig Dispense Refill   • tadalafil (CIALIS) 10 MG tablet Take 1 Tablet by mouth as needed for Erectile Dysfunction. 10 Tablet 3     No current facility-administered medications for this visit.      Patient is taking medications as noted in medication list.  Current supplements as per medication list.     Allergies: Patient has no known allergies.    Current social contact/activities:  Family, friends    Is patient current with immunizations? Yes.    He  reports that he has never smoked. He has never used smokeless tobacco. He reports current alcohol use. He reports that he does not use drugs.  Counseling given: Not Answered      DPA/Advanced directive: Patient does not have an Advanced Directive.  A packet and workshop information was given on Advanced Directives.    ROS:    Gait: Uses no assistive device   Ostomy: No   Other tubes: No   Amputations: No   Chronic oxygen use No   Last eye exam  4 yrs ago   Wears hearing aids: No   : Denies any urinary leakage during  the last 6 months  Pt reports frequent urination     Screening:    Depression Screening  Little interest or pleasure in doing things?  0 - not at allN  Feeling down, depressed, or hopeless? 0 - not at allN  Trouble falling or staying asleep, or sleeping too much?   N  Feeling tired or having little energy?   N  Poor appetite or overeating?   N  Feeling bad about yourself - or that you are a failure or have let yourself or your family down?  N  Trouble concentrating on things, such as reading the newspaper or watching television?  N  Moving or speaking so slowly that other people could have noticed.  Or the opposite - being so fidgety or restless that you have been moving around a lot more than usual?   N  Thoughts that you would be better off dead, or of hurting yourself?     Patient Health Questionnaire Score:  N    If depressive symptoms identified deferred to follow up visit unless specifically addressed in assessment and plan.    Interpretation of PHQ-9 Total Score   Score Severity   1-4 No Depression   5-9 Mild Depression   10-14 Moderate Depression   15-19 Moderately Severe Depression   20-27 Severe Depression      Screening for Cognitive Impairment  Three Minute Recall (daughter, heaven, galileo)  11/3    Draw clock face with all 12 numbers and set the hands to show 10 past 11.  Yes  Y  If cognitive concerns identified, deferred for follow up unless specifically addressed in assessment and plan.    Fall Risk Assessment  Has the patient had two or more falls in the last year or any fall with injury in the last year?  NoN  If fall risk identified, deferred for follow up unless specifically addressed in assessment and plan.    Safety Assessment  Throw rugs on floor.  [place]  Handrails on all stairs.  [place]  Good lighting in all hallways.  [place]  Difficulty hearing.  [place]  Patient counseled about all safety risks that were identified.    Functional Assessment ADLs  Are there any barriers preventing you from cooking  for yourself or meeting nutritional needs?  No.N    Are there any barriers preventing you from driving safely or obtaining transportation?  NoN.    Are there any barriers preventing you from using a telephone or calling for help?  No.N    Are there any barriers preventing you from shopping?  No.N    Are there any barriers preventing you from taking care of your own finances?  No.N    Are there any barriers preventing you from managing your medications?    No.N    Are there any barriers preventing you from showering, bathing or dressing yourself?  No.N    Are you currently engaging in any exercise or physical activity?  Yes.Y     What is your perception of your health?  Good.Good    Health Maintenance Summary          IMM DTaP/Tdap/Td Vaccine (2 - Td or Tdap) Next due on 11/13/2025 11/13/2015  Imm Admin: Tdap Vaccine          COLORECTAL CANCER SCREENING (COLONOSCOPY - Every 10 Years) Next due on 10/19/2031    10/19/2021  COLONOSCOPY (Done)          IMM PNEUMOCOCCAL VACCINE: 65+ Years (Series Information) Completed    08/13/2020  Imm Admin: Pneumococcal polysaccharide vaccine (PPSV-23)    11/13/2015  Imm Admin: Pneumococcal Conjugate Vaccine (Prevnar/PCV-13)          IMM ZOSTER VACCINES (Series Information) Completed    03/30/2021  Imm Admin: Zoster Vaccine Recombinant (RZV) (SHINGRIX)    08/13/2020  Imm Admin: Zoster Vaccine Recombinant (RZV) (SHINGRIX)          IMM INFLUENZA (Series Information) Completed    10/19/2021  Imm Admin: Influenza Vaccine Adult HD    10/05/2020  Imm Admin: Influenza Vaccine Adult HD    10/27/2019  Imm Admin: Influenza Vaccine Adult HD    10/18/2018  Imm Admin: Influenza Vaccine Adult HD    11/07/2017  Imm Admin: Influenza Vaccine Adult HD    Only the first 5 history entries have been loaded, but more history exists.          COVID-19 Vaccine (Series Information) Completed    11/09/2021  Imm Admin: Moderna SARS-CoV-2 Vaccine    03/09/2021  Imm Admin: Moderna SARS-CoV-2 Vaccine     "2021  Imm Admin: Moderna SARS-CoV-2 Vaccine          HEPATITIS C SCREENING  Completed    2022  HCV Scrn ( 2040-2312 1xLife)          Annual Wellness Visit  Completed    2022  Level of Service: ANNUAL WELLNESS VISIT-INCLUDES PPPS SUBSEQUE*          IMM HEP B VACCINE (Series Information) Aged Out    No completion history exists for this topic.          IMM MENINGOCOCCAL VACCINE (MCV4) (Series Information) Aged Out    No completion history exists for this topic.                Patient Care Team:  Celio Santiago P.A.-C. as PCP - General (Physician Assistant)    Social History     Tobacco Use   • Smoking status: Never Smoker   • Smokeless tobacco: Never Used   Vaping Use   • Vaping Use: Never used   Substance Use Topics   • Alcohol use: Yes     Comment: 1 drink per week    • Drug use: Never     No family history on file.  He  has no past medical history on file.   No past surgical history on file.      Exam:   /66 (BP Location: Left arm, Patient Position: Sitting, BP Cuff Size: Adult)   Pulse 75   Temp 36.6 °C (97.8 °F) (Temporal)   Resp 16   Ht 1.905 m (6' 3\")   Wt 104 kg (229 lb 9.6 oz)   SpO2 95%  Body mass index is 28.7 kg/m².    Hearing good.    Dentition fair  Alert, oriented in no acute distress  Eye contact is good, speech goal directed, affect calm.      Assessment and Plan. The following treatment and monitoring plan is recommended:      1.  Erectile dysfunction  -Chronic, untreated.  -Tadalafil 10 mg to be taken once daily as needed for sexual intercourse.  Dispense 10.  Refill 3.    2.  Hypercholesterolemia  -Chronic, untreated.  -I have discussed the patient's options and he wishes to think on this before proceeding with any treatment.  I did give him an opportunity to ask questions about side effects of medication and consequences of nontreatment.  He will contact me when he has made a decision.      Services suggested: No services needed at this time  Health Care " Screening recommendations as per orders if indicated.  Referrals offered: PT/OT/Nutrition counseling/Behavioral Health/Smoking cessation as per orders if indicated.    Discussion today about general wellness and lifestyle habits:    · Prevent falls and reduce trip hazards; Cautioned about securing or removing rugs.  · Have a working fire alarm and carbon monoxide detector;   · Engage in regular physical activity and social activities.     Follow-up: No follow-ups on file.

## 2022-11-10 ENCOUNTER — PATIENT MESSAGE (OUTPATIENT)
Dept: HEALTH INFORMATION MANAGEMENT | Facility: OTHER | Age: 72
End: 2022-11-10

## 2022-12-19 ENCOUNTER — TELEPHONE (OUTPATIENT)
Dept: MEDICAL GROUP | Facility: PHYSICIAN GROUP | Age: 72
End: 2022-12-19
Payer: MEDICARE

## 2022-12-19 NOTE — TELEPHONE ENCOUNTER
VOICEMAIL  1. Caller Name: Mariusz Jackson                      Call Back Number: [phone]     2. Message: patient left  stating he has received conflicting COVID results and would like advise.     3. Patient approves office to leave a detailed voicemail/MyChart message: N\A      Phone Number Called: [phone]     Call outcome: Spoke to patient regarding message below.    Message: Spoke to patient, patient states he is on vacation, last Monday sore throat, progressed to cold like symptoms- [facility] and COVID negative antigen, positive on PCR test. Patient does not know what the next steps are.

## 2023-05-23 SDOH — ECONOMIC STABILITY: FOOD INSECURITY: WITHIN THE PAST 12 MONTHS, THE FOOD YOU BOUGHT JUST DIDN'T LAST AND YOU DIDN'T HAVE MONEY TO GET MORE.: NEVER TRUE

## 2023-05-23 SDOH — ECONOMIC STABILITY: FOOD INSECURITY: WITHIN THE PAST 12 MONTHS, YOU WORRIED THAT YOUR FOOD WOULD RUN OUT BEFORE YOU GOT MONEY TO BUY MORE.: NEVER TRUE

## 2023-05-23 SDOH — ECONOMIC STABILITY: INCOME INSECURITY: IN THE LAST 12 MONTHS, WAS THERE A TIME WHEN YOU WERE NOT ABLE TO PAY THE MORTGAGE OR RENT ON TIME?: NO

## 2023-05-23 SDOH — ECONOMIC STABILITY: HOUSING INSECURITY: IN THE LAST 12 MONTHS, HOW MANY PLACES HAVE YOU LIVED?: 1

## 2023-05-23 SDOH — HEALTH STABILITY: PHYSICAL HEALTH: ON AVERAGE, HOW MANY DAYS PER WEEK DO YOU ENGAGE IN MODERATE TO STRENUOUS EXERCISE (LIKE A BRISK WALK)?: 4 DAYS

## 2023-05-23 SDOH — ECONOMIC STABILITY: INCOME INSECURITY: HOW HARD IS IT FOR YOU TO PAY FOR THE VERY BASICS LIKE FOOD, HOUSING, MEDICAL CARE, AND HEATING?: NOT HARD AT ALL

## 2023-05-23 SDOH — HEALTH STABILITY: PHYSICAL HEALTH: ON AVERAGE, HOW MANY MINUTES DO YOU ENGAGE IN EXERCISE AT THIS LEVEL?: 130 MIN

## 2023-05-23 ASSESSMENT — LIFESTYLE VARIABLES
AUDIT-C TOTAL SCORE: 2
HOW MANY STANDARD DRINKS CONTAINING ALCOHOL DO YOU HAVE ON A TYPICAL DAY: 1 OR 2
SKIP TO QUESTIONS 9-10: 1
HOW OFTEN DO YOU HAVE A DRINK CONTAINING ALCOHOL: 2-4 TIMES A MONTH
HOW OFTEN DO YOU HAVE SIX OR MORE DRINKS ON ONE OCCASION: NEVER

## 2023-05-23 ASSESSMENT — SOCIAL DETERMINANTS OF HEALTH (SDOH)
IN A TYPICAL WEEK, HOW MANY TIMES DO YOU TALK ON THE PHONE WITH FAMILY, FRIENDS, OR NEIGHBORS?: MORE THAN THREE TIMES A WEEK
HOW OFTEN DO YOU GET TOGETHER WITH FRIENDS OR RELATIVES?: ONCE A WEEK
IN A TYPICAL WEEK, HOW MANY TIMES DO YOU TALK ON THE PHONE WITH FAMILY, FRIENDS, OR NEIGHBORS?: MORE THAN THREE TIMES A WEEK
HOW OFTEN DO YOU HAVE A DRINK CONTAINING ALCOHOL: 2-4 TIMES A MONTH
HOW OFTEN DO YOU ATTEND CHURCH OR RELIGIOUS SERVICES?: NEVER
HOW HARD IS IT FOR YOU TO PAY FOR THE VERY BASICS LIKE FOOD, HOUSING, MEDICAL CARE, AND HEATING?: NOT HARD AT ALL
HOW MANY DRINKS CONTAINING ALCOHOL DO YOU HAVE ON A TYPICAL DAY WHEN YOU ARE DRINKING: 1 OR 2
HOW OFTEN DO YOU HAVE SIX OR MORE DRINKS ON ONE OCCASION: NEVER
HOW OFTEN DO YOU ATTENT MEETINGS OF THE CLUB OR ORGANIZATION YOU BELONG TO?: MORE THAN 4 TIMES PER YEAR
HOW OFTEN DO YOU GET TOGETHER WITH FRIENDS OR RELATIVES?: ONCE A WEEK
HOW OFTEN DO YOU ATTENT MEETINGS OF THE CLUB OR ORGANIZATION YOU BELONG TO?: MORE THAN 4 TIMES PER YEAR
DO YOU BELONG TO ANY CLUBS OR ORGANIZATIONS SUCH AS CHURCH GROUPS UNIONS, FRATERNAL OR ATHLETIC GROUPS, OR SCHOOL GROUPS?: YES
WITHIN THE PAST 12 MONTHS, YOU WORRIED THAT YOUR FOOD WOULD RUN OUT BEFORE YOU GOT THE MONEY TO BUY MORE: NEVER TRUE
DO YOU BELONG TO ANY CLUBS OR ORGANIZATIONS SUCH AS CHURCH GROUPS UNIONS, FRATERNAL OR ATHLETIC GROUPS, OR SCHOOL GROUPS?: YES
HOW OFTEN DO YOU ATTEND CHURCH OR RELIGIOUS SERVICES?: NEVER

## 2023-05-24 ENCOUNTER — OFFICE VISIT (OUTPATIENT)
Dept: MEDICAL GROUP | Facility: PHYSICIAN GROUP | Age: 73
End: 2023-05-24
Payer: MEDICARE

## 2023-05-24 VITALS
RESPIRATION RATE: 16 BRPM | DIASTOLIC BLOOD PRESSURE: 66 MMHG | HEART RATE: 66 BPM | HEIGHT: 75 IN | BODY MASS INDEX: 28.57 KG/M2 | WEIGHT: 229.8 LBS | OXYGEN SATURATION: 94 % | TEMPERATURE: 98.4 F | SYSTOLIC BLOOD PRESSURE: 124 MMHG

## 2023-05-24 DIAGNOSIS — M25.562 ACUTE PAIN OF BOTH KNEES: ICD-10-CM

## 2023-05-24 DIAGNOSIS — M25.561 ACUTE PAIN OF BOTH KNEES: ICD-10-CM

## 2023-05-24 DIAGNOSIS — R20.0 BILATERAL HAND NUMBNESS: ICD-10-CM

## 2023-05-24 PROCEDURE — 99213 OFFICE O/P EST LOW 20 MIN: CPT | Performed by: STUDENT IN AN ORGANIZED HEALTH CARE EDUCATION/TRAINING PROGRAM

## 2023-05-24 PROCEDURE — 3074F SYST BP LT 130 MM HG: CPT | Performed by: STUDENT IN AN ORGANIZED HEALTH CARE EDUCATION/TRAINING PROGRAM

## 2023-05-24 PROCEDURE — 3078F DIAST BP <80 MM HG: CPT | Performed by: STUDENT IN AN ORGANIZED HEALTH CARE EDUCATION/TRAINING PROGRAM

## 2023-05-24 RX ORDER — MELOXICAM 7.5 MG/1
7.5 TABLET ORAL DAILY
Qty: 30 TABLET | Refills: 0 | Status: SHIPPED | OUTPATIENT
Start: 2023-05-24 | End: 2023-07-12

## 2023-05-24 ASSESSMENT — FIBROSIS 4 INDEX: FIB4 SCORE: 2.35

## 2023-05-24 ASSESSMENT — PATIENT HEALTH QUESTIONNAIRE - PHQ9: CLINICAL INTERPRETATION OF PHQ2 SCORE: 0

## 2023-05-24 NOTE — PROGRESS NOTES
CC:  Diagnoses of Acute pain of both knees and Bilateral hand numbness were pertinent to this visit.    HISTORY OF THE PRESENT ILLNESS: Patient is a 73 y.o. male. This pleasant patient is here today to discuss:    1. Acute pain of both knees  Approximately 3 months ago the patient took a misstep and twisted his right knee little bit.  This gave him some grief for a while and eventually not only was this knee hurting but his left knee began hurting as well, he feels this may be due to some gait compensation from the right knee pain.  He finds that his symptoms are worse when moving from sitting to standing but once he is walking he feels quite well.  He feels overall the symptoms are getting better.  He has been treating with some glucosamine, no NSAIDs.  He does not report any visible swelling or heat.    2. Bilateral hand numbness  This began approximately 1 or more years ago.  Occasionally he will wake up in the very early morning hours with 1 or both hands numb and painful.  This resolves in short order without any specific treatment.  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  He reports occasionally similar symptoms when driving for a long period of time with his hands up on the steering wheel or sometimes when using gardening tools for an extended period.    Active Diagnosis:    Patient Active Problem List   Diagnosis    Acute left-sided low back pain without sciatica    AK (actinic keratosis)    Encounter for health-related screening    Foot pain, bilateral    Neck pain    History of laminectomy    Other male erectile dysfunction    Hypercholesterolemia    Acute pain of both knees    Bilateral hand numbness      Current Outpatient Medications Ordered in Epic   Medication Sig Dispense Refill    meloxicam (MOBIC) 7.5 MG Tab Take 1 Tablet by mouth every day. Do not take concurrently with Ibuprofen or Naproxen. 30 Tablet 0    tadalafil (CIALIS) 10 MG tablet Take 1 Tablet by mouth as needed for Erectile  "Dysfunction. 10 Tablet 3     No current Epic-ordered facility-administered medications on file.     ROS:   See HPI.    Objective:     Exam: /66 (BP Location: Left arm, Patient Position: Sitting, BP Cuff Size: Adult)   Pulse 66   Temp 36.9 °C (98.4 °F) (Temporal)   Resp 16   Ht 1.905 m (6' 3\")   Wt 104 kg (229 lb 12.8 oz)   SpO2 94%  Body mass index is 28.72 kg/m².    General: Normal appearing. No distress.  neurologic: Grossly nonfocal.  Negative Phalen test.  Negative Tinel about the ulnar notch and the carpal tunnel.  Skin: Warm and dry.  No obvious lesions.  Musculoskeletal: No extremity cyanosis, clubbing, or edema.  Bilateral knee exam: Slight swelling of the right knee versus left.  Slight increase in tension along the right joint line suggesting modest effusion.  No obvious heat, erythema, deformity.  No tenderness to patellar pressure.  No popliteal abnormalities.  Negative anterior/posterior drawer test.  Intact MCL/PCL bilaterally.  Normal gait.  Normal active ROM.              Assessment & Plan:   73 y.o. male with the following -    Labs:   3/22/2022:  -CMP, WNL    1. Acute pain of both knees  -Acute uncomplicated injury.  -Likely the result of the patient's misstep and subsequent compensation.  -Trial conservative therapy with nonsteroidal anti-inflammatory for 1 month.  Time and consider physical therapy and/or imaging if conservative therapy fails.  - meloxicam (MOBIC) 7.5 MG Tab; Take 1 Tablet by mouth every day. Do not take concurrently with Ibuprofen or Naproxen.  Dispense: 30 Tablet; Refill: 0    2. Bilateral hand numbness  -Chronic.  -Despite negative Tinel and Phalen test the patient's symptoms very closely fit that of median nerve entrapment secondary to nocturnal flexing on.  -Bilateral night braces recommended.  OTC.    Return in about 6 weeks (around 7/5/2023).  Follow-up on #1 and 2 above.    Please note that this dictation was created using voice recognition software. I have " made every reasonable attempt to correct obvious errors, but I expect that there are errors of grammar and possibly content that I did not discover before finalizing the note.      Celio Santiago PA-C 5/24/2023

## 2023-07-12 ENCOUNTER — OFFICE VISIT (OUTPATIENT)
Dept: MEDICAL GROUP | Facility: PHYSICIAN GROUP | Age: 73
End: 2023-07-12
Payer: MEDICARE

## 2023-07-12 VITALS
TEMPERATURE: 97.6 F | SYSTOLIC BLOOD PRESSURE: 124 MMHG | WEIGHT: 228.6 LBS | OXYGEN SATURATION: 97 % | HEIGHT: 75 IN | DIASTOLIC BLOOD PRESSURE: 62 MMHG | HEART RATE: 66 BPM | BODY MASS INDEX: 28.42 KG/M2

## 2023-07-12 DIAGNOSIS — M25.561 ACUTE PAIN OF BOTH KNEES: ICD-10-CM

## 2023-07-12 DIAGNOSIS — R20.0 BILATERAL HAND NUMBNESS: ICD-10-CM

## 2023-07-12 DIAGNOSIS — M25.562 ACUTE PAIN OF BOTH KNEES: ICD-10-CM

## 2023-07-12 PROCEDURE — 3074F SYST BP LT 130 MM HG: CPT | Performed by: STUDENT IN AN ORGANIZED HEALTH CARE EDUCATION/TRAINING PROGRAM

## 2023-07-12 PROCEDURE — 3078F DIAST BP <80 MM HG: CPT | Performed by: STUDENT IN AN ORGANIZED HEALTH CARE EDUCATION/TRAINING PROGRAM

## 2023-07-12 PROCEDURE — 99213 OFFICE O/P EST LOW 20 MIN: CPT | Performed by: STUDENT IN AN ORGANIZED HEALTH CARE EDUCATION/TRAINING PROGRAM

## 2023-07-12 ASSESSMENT — FIBROSIS 4 INDEX: FIB4 SCORE: 2.35

## 2023-07-12 NOTE — PROGRESS NOTES
"CC:  Diagnoses of Acute pain of both knees and Bilateral hand numbness were pertinent to this visit.    HISTORY OF THE PRESENT ILLNESS: Patient is a 73 y.o. male. This pleasant patient is here today to discuss:    1. Acute pain of both knees  Patient feels that both knees are slowly improving.  He still senses some degree of bilateral instability.  No swelling, heat, erythema.  He did not feel the meloxicam offered him much benefit.    2. Bilateral hand numbness  Patient has been using a wrist wrap rather than a wrist brace and has noticed significant improvement in his nocturnal symptoms.  He still has occasional numbness of the hands.    Active Diagnosis:    Patient Active Problem List   Diagnosis    Acute left-sided low back pain without sciatica    AK (actinic keratosis)    Encounter for health-related screening    Foot pain, bilateral    Neck pain    History of laminectomy    Other male erectile dysfunction    Hypercholesterolemia    Acute pain of both knees    Bilateral hand numbness      No current Epic-ordered outpatient medications on file.     No current [facility]-ordered facility-administered medications on file.     ROS:   See HPI.    Objective:     Exam: /62 (BP Location: Left arm, Patient Position: Sitting, BP Cuff Size: Adult)   Pulse 66   Temp 36.4 °C (97.6 °F) (Temporal)   Ht 1.905 m (6' 3\")   Wt 104 kg (228 lb 9.6 oz)   SpO2 97%  Body mass index is 28.57 kg/m².    General: Normal appearing. No distress.  neurologic: Grossly nonfocal.    Skin: Warm and dry.  No obvious lesions.  Musculoskeletal: Normal gait.  No extremity cyanosis, clubbing, or edema.  Psych: Normal mood and affect.             Assessment & Plan:   73 y.o. male with the following -    Labs:   No interval labs.    1. Acute pain of both knees  -Acute uncomplicated illness.  -DC meloxicam.  - Referral to Physical Therapy to see if we can get this patient a little bit more stability and comfort of the knee.    2. Bilateral hand " numbness  -Chronic with improvement in symptoms after use of wrist wraps.  -Recommend a more rigid carpal tunnel brace to be used bilaterally each night for further improvement of symptoms.    Return if symptoms worsen or fail to improve.  Schedule an appointment if no significant improvement of the knees after 6 weeks of intervention.    Please note that this dictation was created using voice recognition software. I have made every reasonable attempt to correct obvious errors, but I expect that there are errors of grammar and possibly content that I did not discover before finalizing the note.      Celio Santiago PA-C 7/12/2023

## 2023-07-27 ENCOUNTER — PHYSICAL THERAPY (OUTPATIENT)
Dept: PHYSICAL THERAPY | Facility: REHABILITATION | Age: 73
End: 2023-07-27
Attending: STUDENT IN AN ORGANIZED HEALTH CARE EDUCATION/TRAINING PROGRAM
Payer: MEDICARE

## 2023-07-27 DIAGNOSIS — M25.561 ACUTE PAIN OF BOTH KNEES: ICD-10-CM

## 2023-07-27 DIAGNOSIS — M25.562 ACUTE PAIN OF BOTH KNEES: ICD-10-CM

## 2023-07-27 PROCEDURE — 97110 THERAPEUTIC EXERCISES: CPT

## 2023-07-27 PROCEDURE — 97162 PT EVAL MOD COMPLEX 30 MIN: CPT

## 2023-07-27 NOTE — OP THERAPY EVALUATION
Outpatient Physical Therapy  INITIAL EVALUATION    [facility] [place]  [address], [address]  [place] [address]  Phone:  [phone]    Date of Evaluation: 07/27/2023    Patient: Mariusz Jackson  YOB: 1950  MRN: [id number]     Referring Provider: Celio Santiago P.A.-C.  [address]  [place] [address]   Referring Diagnosis Acute pain of both knees [M25.561, M25.562]     Time Calculation  Start time: 0130  Stop time: 0215 Time Calculation (min): 45 minutes         Chief Complaint: No chief complaint on file.    Visit Diagnoses     ICD-10-CM   1. Acute pain of both knees  M25.561    M25.562       Date of onset of impairment: No data found    Subjective:   History of Present Illness:     Mechanism of injury:  CMHx: Pt indicates that he took a misstep off a curb on his R knee a few mo ago; he noted that he then compensated and developed L knee pain. He got a lot better initially to where he could walk without a lot of issues but now he notes that he still is getting some pain and still facors the R knee. He notes that the R knee is the worst now and the L has improved some. He notes he is also afraid of causing the R knee buckle. He thinks this     Pain Behavior  Sxs: anterior knee pain B maybe slightly more lateral can get a pop with relief on both knees that feels like kneecap region    Aggs: squatting, carrying a suticase/etc to step up with anything hurts the knee, sometimes doesn't feel like he can pop up if in a deadlift sort of position, if sitting a while hard to get up or if really fatigued hard, especially needs to rest after a day on a project; sometimes bending knee a lot to get off a stool/scaffolding, walking downhill hurts    24 hour: PM always more painful but can vary day by day    Sleep: none    Irritability: mild    Yellow flags: fear of injury/buckling    Imaging: none yet per pt    PMHx: none     Profession/Recreation: projects (will be  lifting/step ups on platform for son building a shed), gardens throughout the week, walks, does use a gym but hasn't been to fitness center since things have bugged him     Goals: decrease pain                             No past medical history on file.  No past surgical history on file.  Social History     Tobacco Use    Smoking status: Never    Smokeless tobacco: Never   Substance Use Topics    Alcohol use: Yes     Comment: 1 drink per week      Family and Occupational History     Socioeconomic History    Marital status:      Spouse name: Not on file    Number of children: Not on file    Years of education: Not on file    Highest education level: Doctorate   Occupational History    Not on file       Objective     General Comments     Knee Comments  Standing:  Functional tests  30 sec STS 16 reps; slight ant knee discomfort    Supine:  AROM stiff extension     PROM  Flexion  Flexion with IR  Flexion with ER  Extension stiff B four fingers underneath for distance    MMT  Knee extension 5/5 ea  Knee flexion 5/5 ea    MM length  Hamstring length 40 deg short  Modified Kota slight stiff more hip flexor    Ligamentous tests WFL all  ACL (lachman's, anterior drawer)  PCL (posterior drawer, lag sign, active quad test)  MCL (valgus stress at 0, 30 deg)  LCL (varus stress at 0, 30 deg)    Meniscal Screens WFL   Pedro's screen  Jt line    PFJ mobility not tested today  Superior glide  Inferior glide  Medial glide    Sidelying:  Hip MMT  Hip ABD 4+/5 ea    Prone:  Hip MMT  Extension 4/5 ea  Knee flexion 5/5ea    Rectus length/PKB; stiff mild; more at hip with extension          Therapeutic Exercises (CPT 13908):     1. UPOC 8/27/23      Therapeutic Exercise Summary: Home Exercise Program Created and Reviewed with patient  HS stretch x1'  Quad sets with HR x1'    Goblet squat to raised chair 20# x20 (no sxs)      Time-based treatments/modalities:    Physical Therapy Timed Treatment Charges  Therapeutic exercise  minutes (CPT 51468): 15 minutes      Assessment, Response and Plan:   Impairments: impaired functional mobility, lacks appropriate home exercise program and pain with function    Assessment details:  PT finds s/sx consistent with pain and mobility deficits into extension at his R and L knee. This is evident with decreased A/PROM, subjective pattern of stiffness/pain, and functional assessment. He also has some decreased hip strength and quadriceps strength functionally and pain with anterior knee loading strategies such as step ups/squats. He can benefit from skilled PT care in order to address the above deficits.   Barriers to therapy:  Comorbidities  Prognosis: fair    Goals:   Short Term Goals:   -pt meets [facility] for LEFS  -pt works in yard/garden or does projects with minor sxs during and into evening  -pt walks up/down steps confidently and with minor sxs at most  -pt without giving out at knee functionally and has beginning HEP for strength  Short term goal time span:  2-4 weeks      Long Term Goals:    -pt meets [facility] for LEFS  -pt works in yard/garden or does projects with minor sxs during and no pain after  -pt walks up/down steps confidently and without sxs   -pt without giving out at knee functionally and HEP for strength maintenance  Long term goal time span:  6-8 weeks    Plan:   Therapy options:  Physical therapy treatment to continue  Planned therapy interventions:  E Stim Attended (CPT 61496), E Stim Unattended (CPT 01642), Hot or Cold Pack Therapy (CPT 90768), Manual Therapy (CPT 19093), Mechanical Traction (CPT 44179), Neuromuscular Re-education (CPT 30181), Therapeutic Activities (CPT 45531) and Therapeutic Exercise (CPT 86224)  Frequency:  2x week  Duration in weeks:  8  Duration in visits:  16  Discussed with:  Patient      Functional Assessment Used  WOMAC Grand Total: 54.17     Referring provider co-signature:  I have reviewed this plan of care and my co-signature certifies the need for  services.    Certification Period: 07/27/2023 to  09/28/23    Physician Signature: ________________________________ Date: ______________

## 2023-08-01 NOTE — OP THERAPY DAILY TREATMENT
Outpatient Physical Therapy  DAILY TREATMENT     [facility] Therapy [address] [facility], [address]  [place] [address]  Phone:  [phone]    Date: 08/03/2023  Patient: Mariusz Jackson  YOB: 1950  MRN: [id number]   Time Calculation  Start time: 0130  Stop time: 0210 Time Calculation (min): 40 minutes   Chief Complaint: No chief complaint on file.  Visit #: 2    SUBJECTIVE:  Pt indicates that he overall is still feeling slightly better. He notes that he did one goblet squat at 30lbs and he had pain in the next am. He has since reduced the weight and has had no problems.   Sxs: anterior knee pain B maybe slightly more lateral can get a pop with relief on both knees that feels like kneecap region     Aggs: squatting,   -carrying a suticase/etc to step up with anything hurts the knee,  - sometimes doesn't feel like he can pop up if in a deadlift sort of position,   -if sitting a while hard to get up or if really fatigued hard,   -needs to rest after a day on a project; sometimes bending knee a lot to get off a stool/scaffolding, -walking downhill hurts     OBJECTIVE:  Current objective measures:   30 sec STS 16 reps; slight ant knee discomfort    Extension stiff B four fingers underneath for distance        Therapeutic Exercises (CPT 58830):     1. 1. UPOC 8/27/23      Therapeutic Exercise Summary: HEP:  HS stretch x1'  Quad sets with HR x1', or seated as option     Goblet squat to raised chair 15# 2x15  Bridges DL 2x1'; SL cramping  Lat band walks GTB 2xfatigue  ASLR BTB/4# ankle weight 2xfatigue        Time-based treatments/modalities:  Physical Therapy Timed Treatment Charges  Manual therapy minutes (CPT 04539): 8 minutes  Therapeutic exercise minutes (CPT 84625): 32 minutes  ASSESSMENT:   Response to treatment: pt with s/sx linear to eval; stiff extension and some proximal weakness. Cont to progress as able.     PLAN/RECOMMENDATIONS:   Plan for treatment: therapy treatment to  continue next visit.  Planned interventions for next visit: continue with current treatment.

## 2023-08-03 ENCOUNTER — PHYSICAL THERAPY (OUTPATIENT)
Dept: PHYSICAL THERAPY | Facility: REHABILITATION | Age: 73
End: 2023-08-03
Attending: STUDENT IN AN ORGANIZED HEALTH CARE EDUCATION/TRAINING PROGRAM
Payer: MEDICARE

## 2023-08-03 DIAGNOSIS — M25.561 ACUTE PAIN OF BOTH KNEES: ICD-10-CM

## 2023-08-03 DIAGNOSIS — M25.562 ACUTE PAIN OF BOTH KNEES: ICD-10-CM

## 2023-08-03 PROCEDURE — 97140 MANUAL THERAPY 1/> REGIONS: CPT

## 2023-08-03 PROCEDURE — 97110 THERAPEUTIC EXERCISES: CPT

## 2023-08-09 NOTE — OP THERAPY DAILY TREATMENT
Outpatient Physical Therapy  DAILY TREATMENT     [facility] Therapy [address] [facility], [address]  [place] [address]  Phone:  [phone]    Date: 08/10/2023  Patient: Mariusz Jackson  YOB: 1950  MRN: [id number]   Time Calculation  Start time: 0130  Stop time: 0210 Time Calculation (min): 40 minutes   Chief Complaint: No chief complaint on file.  Visit #: 3    SUBJECTIVE:   Sxs: anterior knee pain B maybe slightly more lateral can get a pop with relief on both knees that feels like kneecap region     Aggs: squatting,   -carrying a suticase/etc to step up with anything hurts the knee,  - sometimes doesn't feel like he can pop up if in a deadlift sort of position,   -if sitting a while hard to get up or if really fatigued hard,   -needs to rest after a day on a project; sometimes bending knee a lot to get off a stool/scaffolding, -walking downhill hurts     OBJECTIVE:   Current objective measures:   30 sec STS 16 reps; slight ant knee discomfort    Extension stiff B four fingers underneath for distance        Therapeutic Exercises (CPT 00453):     1. 1. UPOC 8/27/23      Therapeutic Exercise Summary: HEP:  HS stretch x1'  Quad sets with HR x1', or seated as option     Goblet squat to raised chair 15# 2x15  Bridges DL 2x1'; SL cramping  Lat band walks GTB 2xfatigue  ASLR BTB/4# ankle weight 2xfatigue        Therapeutic Treatments and Modalities:     1. Manual Therapy (CPT 73631), MWM GIII knee ext mobs to improve knee ext mobility  Time-based treatments/modalities:  Physical Therapy Timed Treatment Charges  Manual therapy minutes (CPT 70393): 8 minutes  Therapeutic exercise minutes (CPT 37813): 32 minutes  ASSESSMENT:   Pt responds well to loading with minimal to no sxs today; cont to progress strength and ext mobility as tolerated.    PLAN/RECOMMENDATIONS:   Plan for treatment: therapy treatment to continue next visit.  Planned interventions for next visit: continue with current  treatment.

## 2023-08-10 ENCOUNTER — PHYSICAL THERAPY (OUTPATIENT)
Dept: PHYSICAL THERAPY | Facility: REHABILITATION | Age: 73
End: 2023-08-10
Attending: STUDENT IN AN ORGANIZED HEALTH CARE EDUCATION/TRAINING PROGRAM
Payer: MEDICARE

## 2023-08-10 DIAGNOSIS — M25.562 ACUTE PAIN OF BOTH KNEES: ICD-10-CM

## 2023-08-10 DIAGNOSIS — M25.561 ACUTE PAIN OF BOTH KNEES: ICD-10-CM

## 2023-08-10 PROCEDURE — 97140 MANUAL THERAPY 1/> REGIONS: CPT

## 2023-08-10 PROCEDURE — 97110 THERAPEUTIC EXERCISES: CPT

## 2023-08-28 ENCOUNTER — DOCUMENTATION (OUTPATIENT)
Dept: HEALTH INFORMATION MANAGEMENT | Facility: OTHER | Age: 73
End: 2023-08-28
Payer: MEDICARE

## 2023-09-08 NOTE — OP THERAPY DAILY TREATMENT
Outpatient Physical Therapy  DAILY TREATMENT     [facility] [place] [address] [facility], [address]  [place] [address]  Phone:  [phone]    Date: 09/11/2023  Patient: Mariusz Jackson  YOB: 1950  MRN: [id number]   Time Calculation           Chief Complaint: No chief complaint on file.  Visit #: 4    SUBJECTIVE: pt notes that he was in [facility]. He notes difficulty with steps; he notes that he still gets projects where he could have pain into the next day.  He notes that his intensity of his pain since starting PT has improved; felt like he had more progress here which he relates to ease of schedule/consistency prior to vacation.     Sxs: anterior knee pain B maybe slightly more lateral can get a pop with relief on both knees that feels like kneecap region     Aggs: squatting,   -carrying a suticase/etc to step up with anything hurts the knee,  - sometimes doesn't feel like he can pop up if in a deadlift sort of position,   -if sitting a while hard to get up or if really fatigued hard,   -needs to rest after a day on a project; sometimes bending knee a lot to get off a stool/scaffolding, -walking downhill hurts     OBJECTIVE:   Current objective measures:   30 sec STS 16 reps     Extension stiff B four fingers underneath for distance  Step ups; pain with leg lowering down        Therapeutic Exercises (CPT 33017):     1. 1. UPOC 8/27/23    2. deadlifts 40lbs, 3x10    3. leg press, 8B DL 3x30    4. bike, 3x3' L6-7    6. rest reviewed and performed as per HEP below:    7. next visit progress deadlifts, do an AMRAP?      Therapeutic Exercise Summary: HEP:  HS stretch x1'  Quad sets with HR x1', or seated as option     Goblet squat to raised chair 15# 2x15  Bridges DL 2x1'; SL cramping  Lat band walks GTB 2xfatigue  Leg press and bike x3' circuit      Therapeutic Treatments and Modalities:     1. Manual Therapy (CPT 74269), MWM GIII knee ext mobs to improve knee ext mobility  Time-based  treatments/modalities:     ASSESSMENT: pt cont to do well with loading; no pain during his PT session today. Cont per POC.     PLAN/RECOMMENDATIONS:   Plan for treatment: therapy treatment to continue next visit.  Planned interventions for next visit: continue with current treatment.

## 2023-09-11 ENCOUNTER — PHYSICAL THERAPY (OUTPATIENT)
Dept: PHYSICAL THERAPY | Facility: REHABILITATION | Age: 73
End: 2023-09-11
Attending: STUDENT IN AN ORGANIZED HEALTH CARE EDUCATION/TRAINING PROGRAM
Payer: MEDICARE

## 2023-09-11 DIAGNOSIS — M25.561 ACUTE PAIN OF BOTH KNEES: ICD-10-CM

## 2023-09-11 DIAGNOSIS — M25.562 ACUTE PAIN OF BOTH KNEES: ICD-10-CM

## 2023-09-11 PROCEDURE — 97140 MANUAL THERAPY 1/> REGIONS: CPT

## 2023-09-11 PROCEDURE — 97110 THERAPEUTIC EXERCISES: CPT

## 2023-09-14 NOTE — OP THERAPY DAILY TREATMENT
"  Outpatient Physical Therapy  DAILY TREATMENT     [facility] [place]  [address] [facility], [address]  [place] [address]  Phone:  [phone]    Date: 09/18/2023  Patient: Mariusz Jackson  YOB: 1950  MRN: [id number]   Time Calculation           Chief Complaint: No chief complaint on file.  Visit #: 5    SUBJECTIVE: Since starting PT patient feels more confident in management of his knee. He notes that he feels slightly better since starting of PT.     Sxs: anterior knee pain B maybe slightly more lateral can get a pop with relief on both knees that feels like kneecap region     Aggs: squatting,   -carrying a suticase/etc to step up with anything hurts the knee,  - sometimes doesn't feel like he can pop up if in a deadlift sort of position,   -if sitting a while hard to get up or if really fatigued hard,   -needs to rest after a day on a project; sometimes bending knee a lot to get off a stool/scaffolding, -walking downhill hurts     OBJECTIVE:   Current objective measures:   30 sec STS 16 reps     ASLR 9# 33 L, 29 R reps to fatigue no pain     Extension stiff B four fingers underneath for distance  Step ups; pain with leg lowering down        Therapeutic Exercises (CPT 36638):     1. 1. UPOC 8/27/23    2. deadlifts 40lbs, 2x10    3. leg press, 8B DL 2x10    4. bike, 3x3' L7, 7.5    6. rest reviewed and performed as per HEP below:    7. next visit progress deadlifts, do an AMRAP?    11. step overs, x20; on L side very minor sxs    12. step dwns, 6\" pain      Therapeutic Exercise Summary: HEP:  HS stretch x1'  Quad sets with HR x1', or seated as option     Goblet squat to raised chair 15# 2x15  Bridges DL 2x1'; SL cramping  Lat band walks GTB 2xfatigue  Leg press and bike x3' circuit      Therapeutic Treatments and Modalities:     1. Manual Therapy (CPT 92873), MWM GIII knee ext mobs to improve knee ext mobility  Time-based treatments/modalities:     ASSESSMENT: Pt feels knee pain still " with knee dominant functional movement but has had less pain since starting PT with projects, walking, and steps and without pain lingering into the next day. Due to his progress here along with continued functional gains in his strength I recommend cont skilled PT care.    PLAN/RECOMMENDATIONS:   Plan for treatment: therapy treatment to continue next visit.  Planned interventions for next visit: continue with current treatment.

## 2023-09-18 ENCOUNTER — PHYSICAL THERAPY (OUTPATIENT)
Dept: PHYSICAL THERAPY | Facility: REHABILITATION | Age: 73
End: 2023-09-18
Attending: STUDENT IN AN ORGANIZED HEALTH CARE EDUCATION/TRAINING PROGRAM
Payer: MEDICARE

## 2023-09-18 DIAGNOSIS — M25.562 ACUTE PAIN OF BOTH KNEES: ICD-10-CM

## 2023-09-18 DIAGNOSIS — M25.561 ACUTE PAIN OF BOTH KNEES: ICD-10-CM

## 2023-09-18 PROCEDURE — 97140 MANUAL THERAPY 1/> REGIONS: CPT

## 2023-09-18 PROCEDURE — 97110 THERAPEUTIC EXERCISES: CPT

## 2023-09-18 NOTE — OP THERAPY PROGRESS SUMMARY
Outpatient Physical Therapy  PROGRESS SUMMARY NOTE      [facility] [place]  [address], [address]  [place] [address]  Phone:  [phone]    Date of Visit: 09/18/2023    Patient: Mariusz Jackson  YOB: 1950  MRN: [id number]     Referring Provider: Celio Santiago P.A.-C.  [address]  [place] [address]   Referring Diagnosis Pain in right knee [M25.561];Pain in left knee [M25.562]     Visit Diagnoses     ICD-10-CM   1. Acute pain of both knees  M25.561    M25.562       Rehab Potential: fair    Progress Report Period: 8/08/23-9/18/23    Functional Assessment Used          Objective Findings and Assessment:   Patient progression towards goals:  Pt feels knee pain still with knee dominant functional movement but has had less pain since starting PT with projects, walking, and steps and without pain lingering into the next day. Due to his progress here along with continued functional gains in his strength I recommend cont skilled PT care.    Objective findings and assessment details: See daily note 9/18/23    Goals:   Short Term Goals:     -pt works in yard/garden or does projects with minor sxs during and into evening (MET)  -pt walks up/down steps confidently and with minor sxs at most (75% MET)  -pt without giving out at knee functionally and has beginning HEP for strength (MET)    Short term goal time span:  2-4 weeks      Long Term Goals:    -pt meets [facility] for LEFS  -pt works in yard/garden or does projects with minor sxs during and no pain after  -pt walks up/down steps confidently and without sxs   -pt without giving out at knee functionally and HEP for strength maintenance    Long term goal time span:  6-8 weeks    Plan:   Planned therapy interventions:  E Stim Attended (CPT 58020), E Stim Unattended (CPT 33183), Manual Therapy (CPT 53415), Neuromuscular Re-education (CPT 26986), Hot or Cold Pack Therapy (CPT 92238), Therapeutic Activities (CPT 01709) and  Therapeutic Exercise (CPT 63304)  Frequency:  2x week  Duration in weeks:  8  Duration in visits:  16      Referring provider co-signature:  I have reviewed this plan of care and my co-signature certifies the need for services.     Certification Period: 09/18/2023 to 11/20/23    Physician Signature: ________________________________ Date: ______________

## 2023-09-21 NOTE — OP THERAPY DAILY TREATMENT
"  Outpatient Physical Therapy  DAILY TREATMENT     [facility] Therapy [place] [address] [address], [address]  [place] [address]  Phone:  [phone]    Date: 09/25/2023  Patient: Mariusz Jackson  YOB: 1950  MRN: [id number]   Time Calculation  Start time: 0115  Stop time: 0200 Time Calculation (min): 45 minutes   Chief Complaint: No chief complaint on file.  Visit #: 6    SUBJECTIVE: Pt indicates that he notes he is often painful but minor the next day after PT.     Sxs: anterior knee pain B maybe slightly more lateral can get a pop with relief on both knees that feels like kneecap region     Aggs: squatting,   -carrying a suticase/etc to step up with anything hurts the knee,  - sometimes doesn't feel like he can pop up if in a deadlift sort of position,   -if sitting a while hard to get up or if really fatigued hard,   -needs to rest after a day on a project; sometimes bending knee a lot to get off a stool/scaffolding, -walking downhill hurts     OBJECTIVE:   Current objective measures:     None below:  30 sec STS 16 reps     ASLR 9# 33 L, 29 R reps to fatigue no pain     Extension stiff B four fingers underneath for distance  Step ups; pain with leg lowering down        Therapeutic Exercises (CPT 21162):     1. 1. UPOC 8/27/23    2. deadlifts 40lbs, 2x10 40#, elevated BB x5 65#, no sxs    3. leg press, 8B DL 2x1'    4. bike, 3x3' L7, 7.5, not today    5. HS/quad stretches, x1'ea    6. rest reviewed and performed as per HEP below:    7. next visit progress deadlifts, do an AMRAP?, check on LBP?    11. step overs, x20; on L side very minor sxs, not today    12. step dwns, 6\" pain, not today    16. SL RDL reaches, 3 cones 3x3ea    17. burpees, modified BOSU 3x5ea    18. lat band walks, GTB 2x\"fatigue\"      Therapeutic Exercise Summary: HEP:  HS stretch x1'  Quad sets with HR x1', or seated as option     Goblet squat to raised chair 15# 2x15  Bridges DL 2x1'; SL cramping  Lat band walks GTB " 2xfatigue  Leg press and bike x3' circuit      Therapeutic Treatments and Modalities:     1. Manual Therapy (CPT 61725), MWM GIII knee ext mobs to improve knee ext mobility  Time-based treatments/modalities:  Physical Therapy Timed Treatment Charges  Manual therapy minutes (CPT 53926): 10 minutes  Therapeutic exercise minutes (CPT 73104): 30 minutes  ASSESSMENT: Pt with cont sxs still more with knee dominant exercises. Somewhat limited by LBP at the end of the session. Will monitor as pt progresses. No sxs during lifts today.     PLAN/RECOMMENDATIONS:   Plan for treatment: therapy treatment to continue next visit.  Planned interventions for next visit: continue with current treatment.

## 2023-09-25 ENCOUNTER — PHYSICAL THERAPY (OUTPATIENT)
Dept: PHYSICAL THERAPY | Facility: REHABILITATION | Age: 73
End: 2023-09-25
Attending: STUDENT IN AN ORGANIZED HEALTH CARE EDUCATION/TRAINING PROGRAM
Payer: MEDICARE

## 2023-09-25 DIAGNOSIS — M25.562 ACUTE PAIN OF BOTH KNEES: ICD-10-CM

## 2023-09-25 DIAGNOSIS — M25.561 ACUTE PAIN OF BOTH KNEES: ICD-10-CM

## 2023-09-25 PROCEDURE — 97140 MANUAL THERAPY 1/> REGIONS: CPT

## 2023-09-25 PROCEDURE — 97110 THERAPEUTIC EXERCISES: CPT

## 2023-10-02 ENCOUNTER — PHYSICAL THERAPY (OUTPATIENT)
Dept: PHYSICAL THERAPY | Facility: REHABILITATION | Age: 73
End: 2023-10-02
Attending: STUDENT IN AN ORGANIZED HEALTH CARE EDUCATION/TRAINING PROGRAM
Payer: MEDICARE

## 2023-10-02 DIAGNOSIS — M25.561 ACUTE PAIN OF BOTH KNEES: ICD-10-CM

## 2023-10-02 DIAGNOSIS — M25.562 ACUTE PAIN OF BOTH KNEES: ICD-10-CM

## 2023-10-02 PROCEDURE — 97110 THERAPEUTIC EXERCISES: CPT

## 2023-10-02 PROCEDURE — 97140 MANUAL THERAPY 1/> REGIONS: CPT

## 2023-10-02 NOTE — OP THERAPY DAILY TREATMENT
"  Outpatient Physical Therapy  DAILY TREATMENT     [facility] Outpatient Physical Therapy [place] [id number], [address]  [place] [address]  Phone:  [phone]    Date: 10/02/2023  Patient: Mariusz Jackson  YOB: 1950  MRN: [id number]   Time Calculation           Chief Complaint: No chief complaint on file.  Visit #: 7    SUBJECTIVE: Pt notes he hiked Saturday and had no sig pains or issues.     Sxs: anterior knee pain B maybe slightly more lateral can get a pop with relief on both knees that feels like kneecap region     Aggs: squatting,   -carrying a suticase/etc to step up with anything hurts the knee,  - sometimes doesn't feel like he can pop up if in a deadlift sort of position,   -if sitting a while hard to get up or if really fatigued hard,   -needs to rest after a day on a project; sometimes bending knee a lot to get off a stool/scaffolding, -walking downhill hurts     OBJECTIVE:   Current objective measures:     None below:  30 sec STS 16 reps     ASLR 9# 33 L, 29 R reps to fatigue no pain     Extension stiff B four fingers underneath for distance  Step ups; pain with leg lowering down        Therapeutic Exercises (CPT 33114):     1. 1. UPOC 8/27/23    2. deadlifts 40lbs, 30# 3x15, no sxs    3. leg press, 8B DL 2x1', not today    4. bike, 3x3' L7, 7.5, not today    5. HS/quad stretches, x1'ea    6. rest reviewed and performed as per HEP below:    7. next visit progress deadlifts, do an AMRAP?, check on LBP?    11. step overs, x20; on L side very minor sxs, not today    12. box squats, 24\" chair 3x10 3\" ecc    16. SL RDL reaches, 20# 3x12 UE support    17. burpees, modified BOSU 3x5ea    18. lat band walks, GTB 2x\"fatigue\"      Therapeutic Exercise Summary: HEP:  HS stretch x1'  Quad sets with HR x1', or seated as option     Goblet squat to raised chair 15# 2x15  Bridges DL 2x1'; SL cramping  Lat band walks GTB 2xfatigue  Leg press and bike x3' circuit      Therapeutic Treatments and Modalities: "     1. Manual Therapy (CPT 03161), MWM GIII knee ext mobs to improve knee ext mobility  Time-based treatments/modalities:     ASSESSMENT: Pt with cont mobility deficits; no pain today with modified weight. Plan on slowly progressing strength here over the next week.    PLAN/RECOMMENDATIONS:   Plan for treatment: therapy treatment to continue next visit.  Planned interventions for next visit: continue with current treatment.

## 2023-10-05 NOTE — OP THERAPY DAILY TREATMENT
"  Outpatient Physical Therapy  DAILY TREATMENT     [facility] Outpatient Physical Therapy [place]  [address], [address]  [place] [address]  Phone:  [phone]    Date: 10/09/2023  Patient: Mariusz Jackson  YOB: 1950  MRN: [id number]   Time Calculation           Chief Complaint: No chief complaint on file.  Visit #: 8    SUBJECTIVE: pt notes mariam things are better such as continuing to hike without pain. Gets sxs w/ prolonged sitting or occ sharp pains still. Hard to get off ground and trust his legs.     Sxs: anterior knee pain B maybe slightly more lateral can get a pop with relief on both knees that feels like kneecap region     Aggs: squatting,   -carrying a suticase/etc to step up with anything hurts the knee,  - sometimes doesn't feel like he can pop up if in a deadlift sort of position,   -if sitting a while hard to get up or if really fatigued hard,   -needs to rest after a day on a project; sometimes bending knee a lot to get off a stool/scaffolding, -walking downhill hurts     OBJECTIVE:   Current objective measures: sxs R knee squat *gone after manual)    None below:  30 sec STS 16 reps     ASLR 9# 33 L, 29 R reps to fatigue no pain     Extension stiff B four fingers underneath for distance  Step ups; pain with leg lowering down        Therapeutic Exercises (CPT 04275):     1. 1. UPOC 10/18/23    2. deadlifts 40lbs, 30# 3x15, no sxs    3. leg press, 8B DL 2x1', not today    4. bike, 3x3' L7, 7.5, not today    5. HS/quad stretches, x1'ea    6. rest reviewed and performed as per HEP below:    7. next visit progress deadlifts, do an AMRAP?, check on LBP?    11. step overs, x20; on L side very minor sxs, not today    12. box squats, 24\" chair 3x10 3\" ecc    16. SL RDL reaches, 20# 3x12 UE support    17. burpees, modified BOSU 3x5ea    18. lat band walks, GTB 2x\"fatigue\"      Therapeutic Exercise Summary: HEP:  HS stretch x1'  Quad sets with HR x1', or seated as option     Goblet squat to raised " chair 15# 2x15  Bridges DL 2x1'; SL cramping  Lat band walks GTB 2xfatigue  Leg press and bike x3' circuit      Therapeutic Treatments and Modalities:     1. Manual Therapy (CPT 59271), MWM GIII knee ext mobs to improve knee ext mobility  Time-based treatments/modalities:     ASSESSMENT: Pt with cont deficits in his overall strength and somewhat limited in deeper angles with knee pain. Cont to progress as able.     PLAN/RECOMMENDATIONS:   Plan for treatment: therapy treatment to continue next visit.  Planned interventions for next visit: continue with current treatment.

## 2023-10-09 ENCOUNTER — PHYSICAL THERAPY (OUTPATIENT)
Dept: PHYSICAL THERAPY | Facility: REHABILITATION | Age: 73
End: 2023-10-09
Attending: STUDENT IN AN ORGANIZED HEALTH CARE EDUCATION/TRAINING PROGRAM
Payer: MEDICARE

## 2023-10-09 DIAGNOSIS — M25.562 ACUTE PAIN OF BOTH KNEES: ICD-10-CM

## 2023-10-09 DIAGNOSIS — M25.561 ACUTE PAIN OF BOTH KNEES: ICD-10-CM

## 2023-10-09 PROCEDURE — 97140 MANUAL THERAPY 1/> REGIONS: CPT

## 2023-10-09 PROCEDURE — 97110 THERAPEUTIC EXERCISES: CPT

## 2023-10-13 NOTE — OP THERAPY DAILY TREATMENT
"  Outpatient Physical Therapy  DAILY TREATMENT     [facility] Outpatient Physical Therapy [place]  [address], [address]  [place] [address]  Phone:  [phone]    Date: 10/16/2023  Patient: Mariusz Jackson  YOB: 1950  MRN: [id number]   Time Calculation           Chief Complaint: No chief complaint on file.  Visit #: 9    SUBJECTIVE:pt notes mariam things are better such as continuing to hike without pain. Gets sxs w/ prolonged sitting or occ sharp pains still. Hard to get off ground and trust his legs.     Sxs: anterior knee pain B maybe slightly more lateral can get a pop with relief on both knees that feels like kneecap region     Aggs: squatting,   -carrying a suticase/etc to step up with anything hurts the knee,  - sometimes doesn't feel like he can pop up if in a deadlift sort of position,   -if sitting a while hard to get up or if really fatigued hard,   -needs to rest after a day on a projects; sometimes bending knee a lot to get off a stool/scaffolding, -walking downhill hurts     OBJECTIVE:   Current objective measures:   -No sxs squat today    30 sec STS 17 reps     ASLR 9# 80 L, 90 R reps to fatigue no pain     Extension stiff B four fingers underneath for distance  Step ups; pain with leg lowering down        Therapeutic Exercises (CPT 03722):     1. 1. UPOC 10/18/23    2. deadlifts 40lbs, 30# 3x15, no sxs    3. leg press, 8B DL 2x1', not today    4. bike, 3x3' L7, 7.5, not today    5. HS/quad stretches, x1'ea    6. clams, xfatigue ea    7. bridges, SL/heel contact x20ea    8. ASLR, to fatigue 10#    9. knee ext seated/seated quad sets, x10    11. step overs, x20; on L side very minor sxs, not today    12. assisted squats, x20 slow ecc    16. SL RDL reaches, 20# 3x12 UE support, not today    17. burpees, modified BOSU 3x5ea, not today    18. lat band walks, GTB 2x\"fatigue\", not today      Therapeutic Exercise Summary: HEP:  HS stretch x1'  Quad sets with HR x1', or seated as option     Goblet " squat to raised chair 15# 2x15  Bridges DL 2x1'; SL cramping  Lat band walks GTB 2xfatigue  Leg press and bike x3' circuit      Therapeutic Treatments and Modalities:     1. Manual Therapy (CPT 59962), MWM GIII knee ext mobs to improve knee ext mobility  Time-based treatments/modalities:     Assessment: Pt has been seen for several weeks with improving overall function and up and down knee symptoms. He initially has made good progress with his overall capacity to walk, garden, and have less issues with inclines but cont to have pain after longer projects or even some of his exercises that can be quite significant into the next day. We have adjusted his plan to continue to get him stronger with the goal of still addressing the above limitations along with stiffness with knee extension/etc. Cont skilled PT care.     PLAN/RECOMMENDATIONS:   Plan for treatment: therapy treatment to continue next visit.  Planned interventions for next visit: continue with current treatment.

## 2023-10-16 ENCOUNTER — PHYSICAL THERAPY (OUTPATIENT)
Dept: PHYSICAL THERAPY | Facility: REHABILITATION | Age: 73
End: 2023-10-16
Attending: STUDENT IN AN ORGANIZED HEALTH CARE EDUCATION/TRAINING PROGRAM
Payer: MEDICARE

## 2023-10-16 DIAGNOSIS — M25.562 ACUTE PAIN OF BOTH KNEES: ICD-10-CM

## 2023-10-16 DIAGNOSIS — M25.561 ACUTE PAIN OF BOTH KNEES: ICD-10-CM

## 2023-10-16 PROCEDURE — 97110 THERAPEUTIC EXERCISES: CPT

## 2023-10-16 PROCEDURE — 97140 MANUAL THERAPY 1/> REGIONS: CPT

## 2023-10-16 NOTE — OP THERAPY PROGRESS SUMMARY
Outpatient Physical Therapy  PROGRESS SUMMARY NOTE      [facility] [place]  [address], [address]  [place] [address]  Phone:  [phone]    Date of Visit: 10/16/2023    Patient: Mariusz Jackson  YOB: 1950  MRN: [id number]     Referring Provider: Celio Santiago P.A.-C.  [address]  [place] [address]   Referring Diagnosis Pain in right knee [M25.561];Pain in left knee [M25.562]     Visit Diagnoses     ICD-10-CM   1. Acute pain of both knees  M25.561    M25.562       Rehab Potential: good    Progress Report Period: 9/16-10/16/23    Functional Assessment Used          Objective Findings and Assessment:   Patient progression towards goals: Pt has been seen for several weeks with improving overall function and up and down knee symptoms. He initially has made good progress with his overall capacity to walk, garden, and have less issues with inclines but cont to have pain after longer projects or even some of his exercises that can be quite significant into the next day. We have adjusted his plan to continue to get him stronger with the goal of still addressing the above limitations along with stiffness with knee extension/etc. Cont skilled PT care.     Objective findings and assessment details: See daily note 10/16/23    Goals:   Short Term Goals:   -pt meets MCID for LEFS (MET)  -pt works in yard/garden or does projects with minor sxs during and into evening (MET)  -pt walks up/down steps confidently and with minor sxs at most (50% MET)  -pt without giving out at knee functionally and has beginning HEP for strength (50% MET)  Short term goal time span:  2-4 weeks      Long Term Goals:      NOT MET  -pt meets MCID for LEFS  -pt works in yard/garden or does projects with minor sxs during and no pain after  -pt walks up/down steps confidently and without sxs   -pt without giving out at knee functionally and HEP for strength maintenance    Long term goal time span:  6-8  weeks    Plan:   Planned therapy interventions:  E Stim Attended (CPT 57910), E Stim Unattended (CPT 59647), Hot or Cold Pack Therapy (CPT 91405), Manual Therapy (CPT 11506), Neuromuscular Re-education (CPT 29490), Therapeutic Exercise (CPT 26081) and Therapeutic Activities (CPT 91931)  Frequency:  2x week  Duration in weeks:  8  Duration in visits:  16      Referring provider co-signature:  I have reviewed this plan of care and my co-signature certifies the need for services.     Certification Period: 10/16/2023 to 12/18/23    Physician Signature: ________________________________ Date: ______________

## 2023-11-07 NOTE — OP THERAPY DAILY TREATMENT
"  Outpatient Physical Therapy  DAILY TREATMENT     [facility] Outpatient Physical Therapy [place] [address], [address]  [place] [address]  Phone:  [phone]    Date: 11/08/2023  Patient: Mariusz Jackson  YOB: 1950  MRN: [id number]   Time Calculation           Chief Complaint: No chief complaint on file.  Visit #: 10    SUBJECTIVE: Pt indicates that his knees feel better than the summer. Still doesn't feel like his knees were as good as they were at their peak a few sessions.    Sxs: anterior knee pain B maybe slightly more lateral can get a pop with relief on both knees that feels like kneecap region     Aggs:   -squatting,   -carrying a suticase/etc to step up with anything hurts the knee,  - sometimes doesn't feel like he can pop up if in a deadlift sort of position,   -if sitting a while hard to get up or if really fatigued hard,   -needs to rest after a day on a projects; sometimes bending knee a lot to get off a stool/scaffolding, -walking downhill hurts     OBJECTIVE:   Current objective measures:   -No sxs squat today    Not tested below:  30 sec STS 17 reps     ASLR 9# 80 L, 90 R reps to fatigue no pain     Extension stiff B four fingers underneath for distance  Step ups; no pain today        Therapeutic Exercises (CPT 30803):     1. 1. UPOC 10/18/23    2. deadlifts 40lbs, 30# 3x15    3. leg press, 8B DL 2x1', not today    4. bike, 3x3' L7, 7.5, not today    5. HS/quad stretches, x1'ea    6. clams, xfatigue ea, not today    7. bridges, 20#DB x20    8. ASLR, to fatigue 10#    9. knee ext seated/seated quad sets, x10    11. step overs, x20; on L side very minor sxs, not today    12. STS, x20 slow ecc    16. SL RDL reaches, 20# 3x12 UE support, not today    17. burpees, modified BOSU 3x5ea, not today    18. lat band walks, GTB 2x\"fatigue\"    19. SL hydrant, GTB 2x45\"      Therapeutic Exercise Summary: HEP:  HS stretch x1'  Quad sets with HR x1', or seated as option     Goblet squat to raised " "chair 15# 2x15  Bridges DL 2x20 20#  Lat band walks GTB 2xfatigue or hydrant x45\"H  SL calf raises off platform  Deadlifts 20-25lbs  Leg press and bike x3' circuit      Therapeutic Treatments and Modalities:     1. Manual Therapy (CPT 50415), MWM GIII knee ext mobs to improve knee ext mobility  Time-based treatments/modalities:     Assessment: pt cont with stiffness into knee extension; cont to recommend stretching often here along with strength program to address hip extension/abduction    PLAN/RECOMMENDATIONS:   Plan for treatment: therapy treatment to continue next visit.  Planned interventions for next visit: continue with current treatment.         "

## 2023-11-08 ENCOUNTER — PHYSICAL THERAPY (OUTPATIENT)
Dept: PHYSICAL THERAPY | Facility: REHABILITATION | Age: 73
End: 2023-11-08
Attending: STUDENT IN AN ORGANIZED HEALTH CARE EDUCATION/TRAINING PROGRAM
Payer: MEDICARE

## 2023-11-08 DIAGNOSIS — M25.562 ACUTE PAIN OF BOTH KNEES: ICD-10-CM

## 2023-11-08 DIAGNOSIS — M25.561 ACUTE PAIN OF BOTH KNEES: ICD-10-CM

## 2023-11-08 PROCEDURE — 97140 MANUAL THERAPY 1/> REGIONS: CPT

## 2023-11-08 PROCEDURE — 97110 THERAPEUTIC EXERCISES: CPT

## 2023-11-15 ENCOUNTER — APPOINTMENT (OUTPATIENT)
Dept: PHYSICAL THERAPY | Facility: REHABILITATION | Age: 73
End: 2023-11-15
Attending: STUDENT IN AN ORGANIZED HEALTH CARE EDUCATION/TRAINING PROGRAM
Payer: MEDICARE

## 2023-11-20 ENCOUNTER — PHYSICAL THERAPY (OUTPATIENT)
Dept: PHYSICAL THERAPY | Facility: REHABILITATION | Age: 73
End: 2023-11-20
Attending: STUDENT IN AN ORGANIZED HEALTH CARE EDUCATION/TRAINING PROGRAM
Payer: MEDICARE

## 2023-11-20 DIAGNOSIS — M25.562 ACUTE PAIN OF BOTH KNEES: ICD-10-CM

## 2023-11-20 DIAGNOSIS — M25.561 ACUTE PAIN OF BOTH KNEES: ICD-10-CM

## 2023-11-20 PROCEDURE — 97140 MANUAL THERAPY 1/> REGIONS: CPT

## 2023-11-20 PROCEDURE — 97110 THERAPEUTIC EXERCISES: CPT

## 2023-11-20 NOTE — OP THERAPY DAILY TREATMENT
"  Outpatient Physical Therapy  DAILY TREATMENT     [facility] Outpatient Physical Therapy [place] [address], [address]  [place] [address]  Phone:  [phone]    Date: 11/20/2023  Patient: Mariusz Jackson  YOB: 1950  MRN: [id number]   Time Calculation           Chief Complaint: No chief complaint on file.  Visit #: 11    SUBJECTIVE: Pt indicates up and down pain since last time. Wants to wait how he does between now and with MD visit.    Sxs: anterior knee pain B maybe slightly more lateral can get a pop with relief on both knees that feels like kneecap region     Aggs:   -squatting,   -carrying a suticase/etc to step up with anything hurts the knee,  - sometimes doesn't feel like he can pop up if in a deadlift sort of position,   -if sitting a while hard to get up or if really fatigued hard,   -needs to rest after a day on a projects; sometimes bending knee a lot to get off a stool/scaffolding, -walking downhill hurts     OBJECTIVE:   Current objective measures:   -No sxs squat today    Not tested below:  30 sec STS 17 reps     ASLR 9# 80 L, 90 R reps to fatigue no pain     Extension stiff B four fingers underneath for distance  Step ups; no pain today        Therapeutic Exercises (CPT 08237):     1. 1. UPOC 10/18/23    2. deadlifts 40lbs, 30# 3x15    3. leg press, 8B DL 2x1', not today    4. bike, 3x3' L7, 7.5, not today    5. HS/quad stretches, x1'ea    6. clams, xfatigue ea, not today    7. bridges, 20#DB x20    8. ASLR, to fatigue 10#    9. knee ext seated/seated quad sets, x10    11. step overs, x20; on L side very minor sxs, not today    12. squat, Guyanese squats YTB 2x20    16. SL RDL reaches, 20# 3x12 UE support, not today    17. burpees, modified BOSU 3x5ea, not today    18. lat band walks, GTB 2x\"fatigue\"    19. SL hydrant, GTB 2x45\"      Therapeutic Exercise Summary: HEP:  HS stretch x1'  Quad sets with HR x1', or seated as option     Goblet squat to raised chair 15# 2x15  Bridges DL 2x20 " "20#  Lat band walks GTB 2xfatigue or hydrant x45\"H  SL calf raises off platform  Deadlifts 20-25lbs  Leg press and bike x3' circuit      Therapeutic Treatments and Modalities:     1. Manual Therapy (CPT 68156), MWM GIII knee ext mobs to improve knee ext mobility  Time-based treatments/modalities:     Assessment: pt cont to respond to knee mobility based stretching/mobility and manual for short term pain relief. Recommend he do this before a lot of activity in addition to updating his HEP.     PLAN/RECOMMENDATIONS:   Plan for treatment: therapy treatment to continue next visit.  Planned interventions for next visit: continue with current treatment.         "

## 2023-11-29 ENCOUNTER — PATIENT MESSAGE (OUTPATIENT)
Dept: HEALTH INFORMATION MANAGEMENT | Facility: OTHER | Age: 73
End: 2023-11-29

## 2023-11-30 ENCOUNTER — APPOINTMENT (OUTPATIENT)
Dept: PHYSICAL THERAPY | Facility: REHABILITATION | Age: 73
End: 2023-11-30
Attending: STUDENT IN AN ORGANIZED HEALTH CARE EDUCATION/TRAINING PROGRAM
Payer: MEDICARE

## 2024-01-08 ENCOUNTER — OFFICE VISIT (OUTPATIENT)
Dept: MEDICAL GROUP | Facility: PHYSICIAN GROUP | Age: 74
End: 2024-01-08
Payer: MEDICARE

## 2024-01-08 VITALS
TEMPERATURE: 98 F | HEART RATE: 66 BPM | DIASTOLIC BLOOD PRESSURE: 70 MMHG | HEIGHT: 74 IN | SYSTOLIC BLOOD PRESSURE: 120 MMHG | BODY MASS INDEX: 29.39 KG/M2 | WEIGHT: 229 LBS | OXYGEN SATURATION: 96 %

## 2024-01-08 DIAGNOSIS — M25.562 CHRONIC PAIN OF BOTH KNEES: ICD-10-CM

## 2024-01-08 DIAGNOSIS — M25.561 CHRONIC PAIN OF BOTH KNEES: ICD-10-CM

## 2024-01-08 DIAGNOSIS — G89.29 CHRONIC PAIN OF BOTH KNEES: ICD-10-CM

## 2024-01-08 DIAGNOSIS — R20.0 BILATERAL HAND NUMBNESS: ICD-10-CM

## 2024-01-08 DIAGNOSIS — E78.2 MIXED HYPERLIPIDEMIA: ICD-10-CM

## 2024-01-08 PROCEDURE — 3074F SYST BP LT 130 MM HG: CPT | Performed by: NURSE PRACTITIONER

## 2024-01-08 PROCEDURE — 99214 OFFICE O/P EST MOD 30 MIN: CPT | Performed by: NURSE PRACTITIONER

## 2024-01-08 PROCEDURE — 3078F DIAST BP <80 MM HG: CPT | Performed by: NURSE PRACTITIONER

## 2024-01-08 ASSESSMENT — ENCOUNTER SYMPTOMS
FEVER: 0
NAUSEA: 0
VOMITING: 0
CHILLS: 0
ABDOMINAL PAIN: 0
WEIGHT LOSS: 0
DIZZINESS: 0
SHORTNESS OF BREATH: 0
HEADACHES: 0

## 2024-01-08 ASSESSMENT — FIBROSIS 4 INDEX: FIB4 SCORE: 2.35

## 2024-01-08 NOTE — PROGRESS NOTES
Subjective:     CC: Establish care, bilateral knee pain, hand numbness    HPI:   Mariusz is a 73 y.o. male presents to establish care. Previous PCP was Celio Santiago PA-C. Specialists: Physical therapy. Pt would like to discuss the following today:    Problem   Chronic Pain of Both Knees    Onset: March 2023  Location: Bilateral and generalized  Duration: immediate resolution upon changing position  Frequency: intermittent  Aggravating factors: knee flexion, prolonged walking or walking down hill or up stairs (kareem if carrying weight)  Alleviating factors: rest  He is taking turmeric, unsure if providing relief.   He is currently doing physical therapy to strengthen his quadriceps and calves. Since starting PT, he has noticed improvement in his knee pain. Feels that he has hit a plateau.   Treatments tried: Meloxicam 15mg (no relief), physical therapy  He denies treatment with steroid injection in the past for his knees.   Denies recent falls, injuries, trauma. He walks daily.   He had bilateral knee x-rays done through [facility] about 1 month.     Bilateral Hand Numbness    Onset: 12 months ago  Of note, he was seen at [facility] and had cervical spine x-rays done there 1 month ago.  Reports numbness in all 5 fingers, but most commonly in the first 3 digits. Reports bilateral hand numbness that develops at night, and can wake him. He did trial NSAIDs without improvement. He has been using a brace on a nightly basis, with improvement, but not complete resolution. During the day, repetitive motion (gardening) and driving long distances will also result in numbness.   The numbness causes difficulty in performing precision tasks. Denies  weakness. He reports that when he extends his cervical spine, he feels relief and resolution of his symptoms. Thus, he is wondering if anything in his neck may be causing problems.   He does report chronic neck pain. 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 Denies gait abnormalities, urinary/bowel incontinence.         Health Maintenance/Immunizations: Completed    Current Outpatient Medications   Medication Sig Dispense Refill    Probiotic Product (PROBIOTIC PO) Take  by mouth.      Turmeric (QC TUMERIC COMPLEX PO) Take  by mouth.      multivitamin Tab Take 1 Tablet by mouth every day.       No current facility-administered medications for this visit.     History reviewed. No pertinent past medical history.    History reviewed. No pertinent surgical history.     History reviewed. No pertinent family history.    Social History     Tobacco Use    Smoking status: Never    Smokeless tobacco: Never   Vaping Use    Vaping Use: Never used   Substance Use Topics    Alcohol use: Yes     Comment: 1 drink per week     Drug use: Never      Review of Systems   Constitutional:  Negative for chills, fever, malaise/fatigue and weight loss.   Respiratory:  Negative for shortness of breath.    Cardiovascular:  Negative for chest pain.   Gastrointestinal:  Negative for abdominal pain, nausea and vomiting.   Neurological:  Negative for dizziness and headaches.        Objective:     /70 (BP Location: Right arm, Patient Position: Sitting, BP Cuff Size: Adult)   Pulse 66   Temp 36.7 °C (98 °F) (Temporal)   Ht 1.88 m (6' 2\")   Wt 104 kg (229 lb)   SpO2 96%   BMI 29.40 kg/m²  Body mass index is 29.4 kg/m².     Physical Exam  Constitutional:       Appearance: Normal appearance.   Eyes:      Conjunctiva/sclera: Conjunctivae normal.      Pupils: Pupils are equal, round, and reactive to light.   Cardiovascular:      Rate and Rhythm: Normal rate and regular rhythm.      Heart sounds: Normal heart sounds. No murmur heard.  Pulmonary:      Effort: Pulmonary effort is normal. No respiratory distress.      Breath sounds: Normal breath sounds.   Abdominal:      Tenderness: There is no abdominal tenderness.   Musculoskeletal:      Cervical back: No rigidity or tenderness. Decreased " range of motion.      Right knee: No swelling or crepitus. Normal range of motion. Tenderness (with deep flexion) present.      Instability Tests: Medial Pedro test negative and lateral Pedro test negative.      Left knee: No swelling or crepitus. Normal range of motion. No tenderness.      Instability Tests: Medial Pedro test negative and lateral Pedro test negative.      Right lower leg: No edema.      Left lower leg: No edema.      Comments: Negative Lhermitte sign   Lymphadenopathy:      Cervical: No cervical adenopathy.   Skin:     General: Skin is warm and dry.   Neurological:      General: No focal deficit present.      Mental Status: He is alert and oriented to person, place, and time.      Cranial Nerves: Cranial nerves 2-12 are intact.      Motor: Motor function is intact.      Gait: Gait is intact.   Psychiatric:         Mood and Affect: Mood normal.         Behavior: Behavior normal.       Assessment and Plan:   73 y.o. male with the following -    1. Chronic pain of both knees  Chronic, improving. I recommended he continue with physical therapy and avoidance of aggravating activities.  He had x-rays done through [facility] about a month ago, and I have requested these records.  I discussed that the next treatment would be a steroid injection.  He verbalized understanding and does not find the symptoms sufficiently bothersome enough to pursue this.    2. Bilateral hand numbness  Chronic, stable. He reports some improvement with night bracing, but not complete resolution.  Unclear if etiology may be from cervical spine.  He had some cervical x-rays done at [facility] about 1 month ago and I have requested these records.  Once I obtain those records, will decide on next steps.  - VITAMIN B12; Future    3. Mixed hyperlipidemia  Chronic, stable.  Statin therapy was recommended, we will recheck a fasting lipid profile.  - Comp Metabolic Panel; Future  -  Lipid Profile; Future  - CBC WITH DIFFERENTIAL; Future    Return if symptoms worsen or fail to improve.    Please note that this dictation was created using voice recognition software. I have made every reasonable attempt to correct obvious errors, but I expect that there are errors of grammar and possibly content that I did not discover before finalizing the note.

## 2024-01-08 NOTE — LETTER
[facility]  REGIS Cooley  [address]  [place] [address]  Fax: [phone]   Authorization for Release/Disclosure of   Protected Health Information   Name: BASSAM TO : 1950 SSN: [id number]   Address: [address] [place], [place] [address] [place] [address] Phone:    [phone] (home) [phone] (work)   I authorize the entity listed below to release/disclose the PHI below to:   [facility]/REGIS Cooley and REGIS Cooley   Provider or Entity Name:  [facility]    Address   City, State, [facility]   Phone:      Fax:     Reason for request: continuity of care   Information to be released:    [  ] LAST COLONOSCOPY,  including any PATH REPORT and follow-up  [  ] LAST FIT/COLOGUARD RESULT [  ] LAST DEXA  [  ] LAST MAMMOGRAM  [  ] LAST PAP  [  ] LAST LABS [  ] RETINA EXAM REPORT  [  ] IMMUNIZATION RECORDS  [ x ] Release all info, cervical x-rays      [  ] Check here and initial the line next to each item to release ALL health information INCLUDING  _____ Care and treatment for drug and / or alcohol abuse  _____ HIV testing, infection status, or AIDS  _____ Genetic Testing    DATES OF SERVICE OR TIME PERIOD TO BE DISCLOSED: _____________  I understand and acknowledge that:  * This Authorization may be revoked at any time by you in writing, except if your health information has already been used or disclosed.  * Your health information that will be used or disclosed as a result of you signing this authorization could be re-disclosed by the recipient. If this occurs, your re-disclosed health information may no longer be protected by State or Federal laws.  * You may refuse to sign this Authorization. Your refusal will not affect your ability to obtain treatment.  * This Authorization becomes effective upon signing and will  on (date) __________.      If no date is indicated, this Authorization will  one (1) year from the signature date.    Name:  Mariusz Jackson  Signature: Date:   1/8/2024     PLEASE FAX REQUESTED RECORDS BACK TO: [phone]

## 2024-01-08 NOTE — LETTER
[facility]  REGIS Cooley  [address]  [place] [address]  Fax: [phone]   Authorization for Release/Disclosure of   Protected Health Information   Name: BASSAM TO : 1950 SSN: [id number]   Address: [facility] [address] [place] [address] Phone:    [phone] (home) [phone] (work)   I authorize the entity listed below to release/disclose the PHI below to:   [facility]/REGIS Cooley and REGIS Cooley   Provider or Entity Name:  [facility]   City, State, Zip   Phone:      Fax:     Reason for request: continuity of care   Information to be released:    [  ] LAST COLONOSCOPY,  including any PATH REPORT and follow-up  [  ] LAST FIT/COLOGUARD RESULT [  ] LAST DEXA  [  ] LAST MAMMOGRAM  [  ] LAST PAP  [  ] LAST LABS [  ] RETINA EXAM REPORT  [  ] IMMUNIZATION RECORDS  [ x ] Release all info, including knee x-ray report      [  ] Check here and initial the line next to each item to release ALL health information INCLUDING  _____ Care and treatment for drug and / or alcohol abuse  _____ HIV testing, infection status, or AIDS  _____ Genetic Testing    DATES OF SERVICE OR TIME PERIOD TO BE DISCLOSED: _____________  I understand and acknowledge that:  * This Authorization may be revoked at any time by you in writing, except if your health information has already been used or disclosed.  * Your health information that will be used or disclosed as a result of you signing this authorization could be re-disclosed by the recipient. If this occurs, your re-disclosed health information may no longer be protected by State or Federal laws.  * You may refuse to sign this Authorization. Your refusal will not affect your ability to obtain treatment.  * This Authorization becomes effective upon signing and will  on (date) __________.      If no date is indicated, this Authorization will  one (1) year from the  signature date.    Name: Mariusz Jackson  Signature: Date:   1/8/2024     PLEASE FAX REQUESTED RECORDS BACK TO: [phone]

## 2024-01-10 ENCOUNTER — HOSPITAL ENCOUNTER (OUTPATIENT)
Dept: LAB | Facility: MEDICAL CENTER | Age: 74
End: 2024-01-10
Attending: NURSE PRACTITIONER
Payer: MEDICARE

## 2024-01-10 DIAGNOSIS — R20.0 BILATERAL HAND NUMBNESS: ICD-10-CM

## 2024-01-10 DIAGNOSIS — E78.2 MIXED HYPERLIPIDEMIA: ICD-10-CM

## 2024-01-10 LAB
ALBUMIN SERPL BCP-MCNC: 4.4 G/DL (ref 3.2–4.9)
ALBUMIN/GLOB SERPL: 1.5 G/DL
ALP SERPL-CCNC: 58 U/L (ref 30–99)
ALT SERPL-CCNC: 13 U/L (ref 2–50)
ANION GAP SERPL CALC-SCNC: 14 MMOL/L (ref 7–16)
AST SERPL-CCNC: 21 U/L (ref 12–45)
BASOPHILS # BLD AUTO: 0.5 % (ref 0–1.8)
BASOPHILS # BLD: 0.03 K/UL (ref 0–0.12)
BILIRUB SERPL-MCNC: 0.5 MG/DL (ref 0.1–1.5)
BUN SERPL-MCNC: 16 MG/DL (ref 8–22)
CALCIUM ALBUM COR SERPL-MCNC: 8.9 MG/DL (ref 8.5–10.5)
CALCIUM SERPL-MCNC: 9.2 MG/DL (ref 8.5–10.5)
CHLORIDE SERPL-SCNC: 102 MMOL/L (ref 96–112)
CHOLEST SERPL-MCNC: 216 MG/DL (ref 100–199)
CO2 SERPL-SCNC: 25 MMOL/L (ref 20–33)
CREAT SERPL-MCNC: 0.95 MG/DL (ref 0.5–1.4)
EOSINOPHIL # BLD AUTO: 0.09 K/UL (ref 0–0.51)
EOSINOPHIL NFR BLD: 1.6 % (ref 0–6.9)
ERYTHROCYTE [DISTWIDTH] IN BLOOD BY AUTOMATED COUNT: 46.8 FL (ref 35.9–50)
GFR SERPLBLD CREATININE-BSD FMLA CKD-EPI: 83 ML/MIN/1.73 M 2
GLOBULIN SER CALC-MCNC: 2.9 G/DL (ref 1.9–3.5)
GLUCOSE SERPL-MCNC: 96 MG/DL (ref 65–99)
HCT VFR BLD AUTO: 44.8 % (ref 42–52)
HDLC SERPL-MCNC: 66 MG/DL
HGB BLD-MCNC: 14.5 G/DL (ref 14–18)
IMM GRANULOCYTES # BLD AUTO: 0.02 K/UL (ref 0–0.11)
IMM GRANULOCYTES NFR BLD AUTO: 0.4 % (ref 0–0.9)
LDLC SERPL CALC-MCNC: 129 MG/DL
LYMPHOCYTES # BLD AUTO: 1.83 K/UL (ref 1–4.8)
LYMPHOCYTES NFR BLD: 33 % (ref 22–41)
MCH RBC QN AUTO: 31 PG (ref 27–33)
MCHC RBC AUTO-ENTMCNC: 32.4 G/DL (ref 32.3–36.5)
MCV RBC AUTO: 95.7 FL (ref 81.4–97.8)
MONOCYTES # BLD AUTO: 0.42 K/UL (ref 0–0.85)
MONOCYTES NFR BLD AUTO: 7.6 % (ref 0–13.4)
NEUTROPHILS # BLD AUTO: 3.15 K/UL (ref 1.82–7.42)
NEUTROPHILS NFR BLD: 56.9 % (ref 44–72)
NRBC # BLD AUTO: 0 K/UL
NRBC BLD-RTO: 0 /100 WBC (ref 0–0.2)
PLATELET # BLD AUTO: 187 K/UL (ref 164–446)
PMV BLD AUTO: 10.4 FL (ref 9–12.9)
POTASSIUM SERPL-SCNC: 4.7 MMOL/L (ref 3.6–5.5)
PROT SERPL-MCNC: 7.3 G/DL (ref 6–8.2)
RBC # BLD AUTO: 4.68 M/UL (ref 4.7–6.1)
SODIUM SERPL-SCNC: 141 MMOL/L (ref 135–145)
TRIGL SERPL-MCNC: 104 MG/DL (ref 0–149)
VIT B12 SERPL-MCNC: 318 PG/ML (ref 211–911)
WBC # BLD AUTO: 5.5 K/UL (ref 4.8–10.8)

## 2024-01-10 PROCEDURE — 36415 COLL VENOUS BLD VENIPUNCTURE: CPT

## 2024-01-10 PROCEDURE — 85025 COMPLETE CBC W/AUTO DIFF WBC: CPT

## 2024-01-10 PROCEDURE — 80053 COMPREHEN METABOLIC PANEL: CPT

## 2024-01-10 PROCEDURE — 82607 VITAMIN B-12: CPT

## 2024-01-10 PROCEDURE — 80061 LIPID PANEL: CPT

## 2024-01-10 NOTE — OP THERAPY DAILY TREATMENT
"  Outpatient Physical Therapy  DAILY TREATMENT     [facility] Outpatient Physical Therapy [place] [address]  [place] [address]  Phone:  [phone]    Date: 01/12/2024  Patient: Mariusz Jcakson  YOB: 1950  MRN: [id number]   Time Calculation  Start time: 1015  Stop time: 1100 Time Calculation (min): 45 minutes   Chief Complaint: No chief complaint on file.  Visit #: 12    SUBJECTIVE: Pt reports had a bad few weeks of pain since last visit. Has calmed since. Is not sure why. He went on a trip to [facility] and then getting back. Has been able to keep up with HEP.     Sxs: anterior knee pain B maybe slightly more lateral can get a pop with relief on both knees that feels like kneecap region     Aggs:   -squatting,   -carrying a suticase/etc to step up with anything hurts the knee,  - sometimes doesn't feel like he can pop up if in a deadlift sort of position,   -if sitting a while hard to get up or if really fatigued hard,   -needs to rest after a day on a projects; sometimes bending knee a lot to get off a stool/scaffolding, -walking downhill hurts     OBJECTIVE  Current objective measures:   -No sxs squat today    Not tested below:  30 sec STS 17 reps     ASLR 9# 80 L, 90 R reps to fatigue no pain     Extension stiff B four fingers underneath for distance  Step ups; no pain today        Therapeutic Exercises (CPT 31732):     1. 1. UPOC 10/18/23    2. deadlifts 40lbs, 30# 3x15    3. leg press, 8B DL 2x1', not today    4. bike, 3x3' L7, 7.5, not today    5. HS/quad stretches, x1'ea    6. DKTC, x2'    7. bridges, 20#DB x20, 35# x20    8. ASLR, to fatigue 10#, not today    9. knee ext seated/seated quad sets, x10    10. SL calf raises, x15ea    11. step overs, x20; on L side very minor sxs, not today    12. squat, Burmese squats YTB 2x20, not today    16. SL RDL reaches, 20# 3x12 UE support, not today    17. burpees, modified BOSU 3x5ea, not today    18. lat band walks, GTB 2x\"fatigue\"    19. SL hydrant, " "GTB 2x45\", not today      Therapeutic Exercise Summary: HEP: AM/PM stretch, 3+x/week workout  HS stretch x1' seated/knee ext  Quad stretch supine  Ball rolls or bike x2'     Goblet squat to raised chair 15# 2x15  Bridges DL 2x20 20#-40#  Lat band walks GTB 2xfatigue or hydrant x45\"H  SL calf raises off platform  Deadlifts 20-25lbs; PRN  Leg press and bike x3' circuit      Therapeutic Treatments and Modalities:     1. Manual Therapy (CPT 64550), hip flexor/quad stretch, STW quads, PFJ mobs GIV  Time-based treatments/modalities:  Physical Therapy Timed Treatment Charges  Manual therapy minutes (CPT 25851): 15 minutes  Therapeutic exercise minutes (CPT 63432): 25 minutes  Assessment: Pt cont to have within session changes with decreased pain at his knee. We developed a plan for daily stretching to emphasize and we have been progressing his strength program which he has mostly tolerated. We plan to slowly add more progressive overload and continue to progress his HEP to improve his function for things like steps/stairs, getting off the ground, squatting/etc. He can continue to benefit from skilled PT care to address his overall impairments of decreased knee flexion, and decreased strength of knee/hip extensors.     PLAN/RECOMMENDATIONS:   Plan for treatment: therapy treatment to continue next visit.  Planned interventions for next visit: continue with current treatment.         "

## 2024-01-12 ENCOUNTER — PHYSICAL THERAPY (OUTPATIENT)
Dept: PHYSICAL THERAPY | Facility: REHABILITATION | Age: 74
End: 2024-01-12
Attending: STUDENT IN AN ORGANIZED HEALTH CARE EDUCATION/TRAINING PROGRAM
Payer: MEDICARE

## 2024-01-12 DIAGNOSIS — M25.562 ACUTE PAIN OF BOTH KNEES: ICD-10-CM

## 2024-01-12 DIAGNOSIS — M25.561 ACUTE PAIN OF BOTH KNEES: ICD-10-CM

## 2024-01-12 PROCEDURE — 97140 MANUAL THERAPY 1/> REGIONS: CPT

## 2024-01-12 PROCEDURE — 97110 THERAPEUTIC EXERCISES: CPT

## 2024-01-12 NOTE — OP THERAPY PROGRESS SUMMARY
Outpatient Physical Therapy  PROGRESS SUMMARY NOTE      [facility] [place]  [address], [address]  [place] [address]  Phone:  [phone]    Date of Visit: 01/12/2024    Patient: Mariusz Jackson  YOB: 1950  MRN: [id number]     Referring Provider: Celio Santiago P.A.-C.  [address] [facility] [place] [address]   Referring Diagnosis Pain in right knee [M25.561];Pain in left knee [M25.562]     Visit Diagnoses     ICD-10-CM   1. Acute pain of both knees  M25.561    M25.562       Rehab Potential: fair    Progress Report Period: 10/16-1/12/24    Functional Assessment Used          Objective Findings and Assessment:   Patient progression towards goals: Pt cont to have within session changes with decreased pain at his knee. We developed a plan for daily stretching to emphasize and we have been progressing his strength program which he has mostly tolerated. We plan to slowly add more progressive overload and continue to progress his HEP to improve his function for things like steps/stairs, getting off the ground, squatting/etc. He can continue to benefit from skilled PT care to address his overall impairments of decreased knee flexion, and decreased strength of knee/hip extensors.     Objective findings and assessment details: See daily note 1/12/24    Goals:   Short Term Goals:   -pt meets MCID for LEFS (MET)  -pt works in yard/garden or does projects with minor sxs during and into evening (MET)  -pt walks up/down steps confidently and with minor sxs at most (50% MET)  -pt without giving out at knee functionally and has beginning HEP for strength (50% MET)    Short term goal time span:  2-4 weeks      Long Term Goals:      -pt meets MCID for LEFS (NOT MET)  -pt works in yard/garden or does projects with minor sxs during and no pain after (NOT MET)  -pt walks up/down steps confidently and without sxs >75% of the time (50% MET)  -pt with HEP of self edu on how to progress/regress  (50% MET)  -pt without giving out at knee functionally and HEP for strength maintenance (50% MET)           Long term goal time span:  6-8 weeks    Plan:   Planned therapy interventions:  E Stim Attended (CPT 70891), E Stim Unattended (CPT 07571), Hot or Cold Pack Therapy (CPT 44976), Manual Therapy (CPT 38359), Mechanical Traction (CPT 73209), Neuromuscular Re-education (CPT 11788), Therapeutic Exercise (CPT 85035) and Therapeutic Activities (CPT 34911)  Frequency:  1x week  Duration in weeks:  8  Duration in visits:  4      Referring provider co-signature:  I have reviewed this plan of care and my co-signature certifies the need for services.     Certification Period: 01/12/2024 to 03/15/24    Physician Signature: ________________________________ Date: ______________

## 2024-02-05 NOTE — OP THERAPY DAILY TREATMENT
"  Outpatient Physical Therapy  DAILY TREATMENT     [facility] [place]  [address]  [place] [address]  Phone:  [phone]    Date: 02/06/2024  Patient: Mariusz Jackson  YOB: 1950  MRN: [id number]   Time Calculation           Chief Complaint: No chief complaint on file.  Visit #: 13    SUBJECTIVE: *** Pt reports had a bad few weeks of pain since last visit. Has calmed since. Is not sure why. He went on a trip to [facility] and then getting back. Has been able to keep up with HEP.     Sxs: anterior knee pain B maybe slightly more lateral can get a pop with relief on both knees that feels like kneecap region     Aggs:   -squatting,   -carrying a suticase/etc to step up with anything hurts the knee,  - sometimes doesn't feel like he can pop up if in a deadlift sort of position,   -if sitting a while hard to get up or if really fatigued hard,   -needs to rest after a day on a projects; sometimes bending knee a lot to get off a stool/scaffolding, -walking downhill hurts     OBJECTIVE ***  Current objective measures:   -No sxs squat today    Not tested below:  30 sec STS 17 reps     ASLR 9# 80 L, 90 R reps to fatigue no pain     Extension stiff B four fingers underneath for distance  Step ups; no pain today        Therapeutic Exercises (CPT 99444):     1. 2/6/24    2. deadlifts 40lbs, 30# 3x15    3. leg press, 8B DL 2x1', not today    4. bike, 3x3' L7, 7.5, not today    5. HS/quad stretches, x1'ea    6. DKTC, x2'    7. bridges, 20#DB x20, 35# x20    8. ASLR, to fatigue 10#, not today    9. knee ext seated/seated quad sets, x10    10. SL calf raises, x15ea    11. step overs, x20; on L side very minor sxs, not today    12. squat, Maltese squats YTB 2x20, not today    16. SL RDL reaches, 20# 3x12 UE support, not today    17. burpees, modified BOSU 3x5ea, not today    18. lat band walks, GTB 2x\"fatigue\"    19. SL hydrant, GTB 2x45\", not today      Therapeutic Exercise Summary: HEP: " "AM/PM stretch, 3+x/week workout  HS stretch x1' seated/knee ext  Quad stretch supine  Ball rolls or bike x2'     Goblet squat to raised chair 15# 2x15  Bridges DL 2x20 20#-40#  Lat band walks GTB 2xfatigue or hydrant x45\"H  SL calf raises off platform  Deadlifts 20-25lbs; PRN  Leg press and bike x3' circuit      Therapeutic Treatments and Modalities:     1. Manual Therapy (CPT 71329), hip flexor/quad stretch, STW quads, PFJ mobs GIV  Time-based treatments/modalities:     Assessment: *** Pt cont to have within session changes with decreased pain at his knee. We developed a plan for daily stretching to emphasize and we have been progressing his strength program which he has mostly tolerated. We plan to slowly add more progressive overload and continue to progress his HEP to improve his function for things like steps/stairs, getting off the ground, squatting/etc. He can continue to benefit from skilled PT care to address his overall impairments of decreased knee flexion, and decreased strength of knee/hip extensors.     PLAN/RECOMMENDATIONS:   Plan for treatment: therapy treatment to continue next visit.  Planned interventions for next visit: continue with current treatment.         "

## 2024-02-06 ENCOUNTER — PHYSICAL THERAPY (OUTPATIENT)
Dept: PHYSICAL THERAPY | Facility: REHABILITATION | Age: 74
End: 2024-02-06
Attending: STUDENT IN AN ORGANIZED HEALTH CARE EDUCATION/TRAINING PROGRAM
Payer: MEDICARE

## 2024-02-06 DIAGNOSIS — M25.562 ACUTE PAIN OF BOTH KNEES: ICD-10-CM

## 2024-02-06 DIAGNOSIS — M25.561 ACUTE PAIN OF BOTH KNEES: ICD-10-CM

## 2024-02-06 PROCEDURE — 97140 MANUAL THERAPY 1/> REGIONS: CPT

## 2024-02-06 PROCEDURE — 97110 THERAPEUTIC EXERCISES: CPT

## 2024-02-06 NOTE — OP THERAPY DAILY TREATMENT
"  Outpatient Physical Therapy  DAILY TREATMENT     [facility] Outpatient Physical Therapy [place] [address]  [place] [address]  Phone:  [phone]    Date: 02/06/2024  Patient: Mariusz Jackson  YOB: 1950  MRN: [id number]   Time Calculation  Start time: 0945  Stop time: 1030 Time Calculation (min): 45 minutes   Chief Complaint: acute B ant knee pain  Visit #: 13    SUBJECTIVE: Pt reports sudden onset of pain on L knee that lasted 4-5 days and made knee flexion difficult. After episode, knee pain reduced even with increased activity from shoveling snow. Has been keeping up with HEP and going to the gym regularly, avoiding heavy weights. Walking ~ 1 mile every day.         Sxs: anterior knee pain B maybe slightly more lateral can get a pop with relief on both knees that feels like kneecap region     Aggs:   -squatting,   -carrying a suticase/etc to step up with anything hurts the knee,  - sometimes doesn't feel like he can pop up if in a deadlift sort of position,   -if sitting a while hard to get up or if really fatigued hard,   -needs to rest after a day on a projects; sometimes bending knee a lot to get off a stool/scaffolding, -walking downhill hurts     OBJECTIVE  Current objective measures:   -No sxs squat today    SL balance with eyes closed 30s  Errors L/R: 9/11    Knee ROM   0-~10-~120*B    8in Step-up L/R:  */ \"popping\"  on R              Lower Extremity Functional Scale Percentage: 76.25     Therapeutic Exercises (CPT 05599):     1. 1. UPOC 10/18/23    2. deadlifts 40lbs, 40# 3x15    3. leg press, 8B DL 2x1', not today    4. bike, x3', today Nu step    5. HS/quad stretches, x1'ea, not today    6. DKTC, x2'    7. bridges, 35# x20, hamstring cramp with S/L    10. SL calf raises, x15ea, 2in step, 2up, 1down    12. squat, Telugu squats YTB 2x20, not today    13. S/L Step-ups with ext resistance, Bx15 BTB, not tolerated will, medial knee * on L    16. SL RDL reaches, 20# 3x12 UE support, " "not today    18. lat band walks, GTB 2x\"fatigue\", reviewed      Therapeutic Exercise Summary: HEP: AM/PM stretch, 3+x/week workout  HS stretch x1' seated/knee ext  Quad stretch supine  Ball rolls or bike x2'      Goblet squat to raised chair 15# 2x15  Bridges DL 2x20 20#-40#  Lat band walks GTB 2xfatigue x45\"H  SL calf raises off 2in platform DL up; SL down  Deadlifts 40bs   Leg press and bike x3' circuit      Therapeutic Treatments and Modalities:     1. Manual Therapy (CPT 51549), knee ext grade 3-4 B  Time-based treatments/modalities:  Physical Therapy Timed Treatment Charges  Manual therapy minutes (CPT 66698): 10 minutes  Therapeutic exercise minutes (CPT 20530): 35 minutes  Assessment:   Pt cont to show improvement with knee mobility and strengthening exercises. Ant knee pain continues to fluctuate in severity but is steadily improving. Pt shows improvement with painless squat. Step-ups remain a challenge due to pain a crepitus in knees. Pt struggled with S/L balance with eyes closed. We plan to continue to progress LE strengthening to improve knee pain and function. S/L exercises will be of particular benefit for strength and balance. Pt will continue to benefit from skilled PT care to address strength and balance impairments.         PLAN/RECOMMENDATIONS:   Plan for treatment: therapy treatment to continue next visit.  S/L STS, split stance balance on foam   Planned interventions for next visit: continue with current treatment.         "

## 2024-02-06 NOTE — OP THERAPY DAILY TREATMENT
"  Outpatient Physical Therapy  DAILY TREATMENT     [facility] Outpatient Physical Therapy [place] [address]  [place] [address]  Phone:  [phone]    Date: 02/06/2024  Patient: Mariusz Jackson  YOB: 1950  MRN: [id number]   Time Calculation           Chief Complaint: acute B ant knee pain  Visit #: 13    SUBJECTIVE: Pt reports sudden onset of pain on L knee that lasted 4-5 days and made knee flexion difficult. After episode, knee pain reduced even with increased activity from shoveling snow. Has been keeping up with HEP and going to the gym regularly, avoiding heavy weights. Walking ~ 1 mile every day.         Sxs: anterior knee pain B maybe slightly more lateral can get a pop with relief on both knees that feels like kneecap region     Aggs:   -squatting,   -carrying a suticase/etc to step up with anything hurts the knee,  - sometimes doesn't feel like he can pop up if in a deadlift sort of position,   -if sitting a while hard to get up or if really fatigued hard,   -needs to rest after a day on a projects; sometimes bending knee a lot to get off a stool/scaffolding, -walking downhill hurts     OBJECTIVE  Current objective measures:   -No sxs squat today    SL balance with eyes closed 30s  Errors L/R: 9/11    Knee ROM   0-~10-~120*B    8in Step-up L/R:  */ \"popping\"  on R                    Therapeutic Exercises (CPT 08805):     1. 1. UPOC 10/18/23    2. deadlifts 40lbs, 40# 3x15    3. leg press, 8B DL 2x1', not today    4. bike, 3x3' L7, 7.5, not today    5. HS/quad stretches, x1'ea, not today    6. DKTC, x2'    7. bridges, 35# x20, hamstring cramp with S/L    8. ASLR, to fatigue 10#, not today    9. knee ext seated/seated quad sets, x10, not today    10. SL calf raises, x15ea, 2in step, 2up, 1down    11. step overs, x20; on L side very minor sxs, not today    12. squat, Samoan squats YTB 2x20, not today    13. S/L Step-ups with ext resistance, Bx15 BTB, not tolerated will, medial knee * " "on L    16. SL RDL reaches, 20# 3x12 UE support, not today    17. bushra, modified BOSU 3x5ea, not today    18. lat band walks, GTB 2x\"fatigue\"    19. SL hydrant, GTB 2x45\", not today      Therapeutic Exercise Summary: HEP: AM/PM stretch, 3+x/week workout  HS stretch x1' seated/knee ext  Quad stretch supine  Ball rolls or bike x2'      Goblet squat to raised chair 15# 2x15  Bridges DL 2x20 20#-40#  Lat band walks GTB 2xfatigue or hydrant x45\"H  SL calf raises off 2in platform  Deadlifts 40bs; PRN  Leg press and bike x3' circuit      Therapeutic Treatments and Modalities:     1. Manual Therapy (CPT 64180), knee ext grade 3-4 B  Time-based treatments/modalities:     Assessment:   Pt cont to show improvement with knee mobility and strengthening exercises. Ant knee pain continues to fluctuate in severity but is steadily improving. Pt shows improvement with painless squat. Step-ups remain a challenge due to pain a crepitus in knees. Pt struggled with S/L balance with eyes closed. We plan to continue to progress LE strengthening to improve knee pain and function. S/L exercises will be of particular benefit for strength and balance. Pt will continue to benefit from skilled PT care to address strength and balance impairments.         PLAN/RECOMMENDATIONS:   Plan for treatment: therapy treatment to continue next visit.  S/L STS, split stance balance on foam   Planned interventions for next visit: continue with current treatment.         "

## 2024-02-06 NOTE — OP THERAPY DAILY TREATMENT
Outpatient Physical Therapy  PROGRESS SUMMARY NOTE      [facility] [place]  [address]  [place] [address]  Phone:  [phone]    Date of Visit: 02/06/2024    Patient: Mariusz Jackson  YOB: 1950  MRN: [id number]     Referring Provider: Celio Santiago P.A.-C.  [address] [place] [address]   Referring Diagnosis Pain in right knee [M25.561];Pain in left knee [M25.562]           Rehab Potential: fair    Progress Report Period: 1/12/24-2/6/24    Functional Assessment Used LEFS  Lower Extremity Functional Scale Percentage: 76.25       Objective Findings and Assessment:   Patient progression towards goals: Pt cont to show improvement with knee mobility and strengthening exercises. Ant knee pain continues to fluctuate in severity but is steadily improving. Pt shows improvement with painless squat. Step-ups remain a challenge due to pain a crepitus in knees. Pt struggled with S/L balance with eyes closed. We plan to continue to progress LE strengthening to improve knee pain and function. S/L exercises will be of particular benefit for strength and balance. Pt will continue to benefit from skilled PT care to address strength and balance impairments.       Objective findings and assessment details: See daily note 2/6/24    Goals:   Short Term Goals:   -pt meets MCID for LEFS (MET)  -pt works in yard/garden or does projects with minor sxs during and into evening (MET)  -pt walks up/down steps confidently and with minor sxs at most (100% MET)  -pt without giving out at knee functionally and has beginning HEP for strength (100% MET)      Short term goal time span:  2-4 weeks      Long Term Goals:      -pt meets MCID for LEFS (MET)  -pt works in yard/garden or does projects with minor sxs during and no pain after (NOT MET)  -pt walks up/down steps confidently and without sxs >75% of the time (50% MET)  -pt with HEP of self edu on how to progress/regress (50%  MET)  -pt without giving out at knee functionally and HEP for strength maintenance (MET)   -pt will perform S/L balance with eyes closed for 30s with less than 5 errors within 4 sessions to improve LE stability and balance. (NOT MET)        Long term goal time span:  6-8 weeks    Plan:   Planned therapy interventions:  E Stim Attended (CPT 49994), E Stim Unattended (CPT 55299), Hot or Cold Pack Therapy (CPT 94095), Manual Therapy (CPT 27582), Mechanical Traction (CPT 74901), Neuromuscular Re-education (CPT 76732), Therapeutic Exercise (CPT 49854) and Therapeutic Activities (CPT 49880)  Frequency:  2x month  Duration in weeks:  8  Duration in visits:  4      Referring provider co-signature:  I have reviewed this plan of care and my co-signature certifies the need for services.     Certification Period: 02/06/2024 to 04/09/24    Physician Signature: ________________________________ Date: ______________          Exercises/Treatment

## 2024-02-06 NOTE — OP THERAPY DAILY TREATMENT
Outpatient Physical Therapy  PROGRESS SUMMARY NOTE      [facility] [place]  [address], [address]  [place] [address]  Phone:  [phone]    Date of Visit: 02/06/2024    Patient: Mariusz Jackson  YOB: 1950  MRN: [id number]     Referring Provider: Celio Santiago P.A.-C.  [address] [facility] [address]   Referring Diagnosis Pain in right knee [M25.561];Pain in left knee [M25.562]           Rehab Potential: fair    Progress Report Period: 1/12/24-2/6/24    Functional Assessment Used LEFS          Objective Findings and Assessment:   Patient progression towards goals: Pt cont to show improvement with knee mobility and strengthening exercises. Ant knee pain continues to fluctuate in severity but is steadily improving. Pt shows improvement with painless squat. Step-ups remain a challenge due to pain a crepitus in knees. Pt struggled with S/L balance with eyes closed. We plan to continue to progress LE strengthening to improve knee pain and function. S/L exercises will be of particular benefit for strength and balance. Pt will continue to benefit from skilled PT care to address strength and balance impairments.       Objective findings and assessment details: See daily note 2/6/24    Goals:   Short Term Goals:   -pt meets MCID for LEFS (MET)  -pt works in yard/garden or does projects with minor sxs during and into evening (MET)  -pt walks up/down steps confidently and with minor sxs at most (100% MET)  -pt without giving out at knee functionally and has beginning HEP for strength (100% MET)      Short term goal time span:  2-4 weeks      Long Term Goals:      -pt meets MCID for LEFS (MET)  -pt works in yard/garden or does projects with minor sxs during and no pain after (NOT MET)  -pt walks up/down steps confidently and without sxs >75% of the time (50% MET)  -pt with HEP of self edu on how to progress/regress (50% MET)  -pt without giving out at knee functionally and  HEP for strength maintenance (MET)   -pt will perform S/L balance with eyes closed for 30s with less than 5 errors within 4 sessions to improve LE stability and balance. (NOT MET)        Long term goal time span:  6-8 weeks    Plan:   Planned therapy interventions:  E Stim Attended (CPT 62639), E Stim Unattended (CPT 15900), Hot or Cold Pack Therapy (CPT 33460), Manual Therapy (CPT 79036), Mechanical Traction (CPT 00215), Neuromuscular Re-education (CPT 25194), Therapeutic Exercise (CPT 00923) and Therapeutic Activities (CPT 39277)  Frequency:  2x month  Duration in weeks:  8  Duration in visits:  4      Referring provider co-signature:  I have reviewed this plan of care and my co-signature certifies the need for services.     Certification Period: 02/06/2024 to 04/09/24    Physician Signature: ________________________________ Date: ______________          Exercises/Treatment

## 2024-02-16 ENCOUNTER — APPOINTMENT (OUTPATIENT)
Dept: PHYSICAL THERAPY | Facility: REHABILITATION | Age: 74
End: 2024-02-16
Payer: MEDICARE

## 2024-03-05 NOTE — OP THERAPY DAILY TREATMENT
"  Outpatient Physical Therapy  DAILY TREATMENT     [facility] [place] [address], [address]  [place] [address]  Phone:  [phone]    Date: 03/06/2024  Patient: Mariusz Jackson  YOB: 1950  MRN: [id number]   Time Calculation  Start time: 0330  Stop time: 0415 Time Calculation (min): 45 minutes   Chief Complaint: acute B ant knee pain  Visit #: 14    SUBJECTIVE: pt notes he was sick so he didn't really exercise for about half the time since last PT session.     Sxs: anterior knee pain B maybe slightly more lateral can get a pop with relief on both knees that feels like kneecap region     Aggs:   -squatting,   -carrying a suticase/etc to step up with anything hurts the knee,  - sometimes doesn't feel like he can pop up if in a deadlift sort of position,   -if sitting a while hard to get up or if really fatigued hard,   -needs to rest after a day on a projects; sometimes bending knee a lot to get off a stool/scaffolding, -walking downhill hurts     OBJECTIVE   Current objective measures:   -No sxs squat today    SL balance with eyes closed 30s  Errors L/R: 9/11    None below:  Knee ROM   0-~10-~120*B    8in Step-up L/R:  */ \"popping\"  on R        Therapeutic Exercises (CPT 18369):     1. 1. UPOC 10/18/23    2. deadlifts 40lbs, 40# 3x15    3. leg press, 8B DL 2x1', not today    4. bike, 3x3' L6.5 circuit    5. HS/quad stretches, x1'ea, not today    6. DKTC, x2', not today    7. bridges, 35# x20, SL 15# x15ea    8. carries, 40# KB [id number]    10. SL calf raises, x15ea, 2in step, 2up, 1down, not today    12. squat, 3x6 15#    13. S/L Step-ups with ext resistance, Bx15 BTB, not today    16. SL RDL reaches, 20# 3x12 UE support, not today    18. lat band walks, GTB 2x\"fatigue\", not today      Therapeutic Exercise Summary: HEP: AM/PM stretch, 3+x/week workout  HS stretch x1' seated/knee ext  Quad stretch supine  Ball rolls or bike x2'    carries  Mini lunges  Goblet squat to raised " "chair 15# 2x15 or air squat  Bridges DL 2x20 20#-40#; SL 15#  Lat band walks GTB 2xfatigue x45\"H  SL calf raises off 2in platform DL up; SL down  Deadlifts 40bs   Leg press and bike x3' circuit      Therapeutic Treatments and Modalities:     1. Manual Therapy (CPT 19995), knee ext grade 3-4 B, not today  Time-based treatments/modalities:  Physical Therapy Timed Treatment Charges  Therapeutic exercise minutes (CPT 41459): 45 minutes  Assessment: Pt without knee pain during session. Did a circuit style of training and prompted some ideas for self improvement and progression of HEP. Discussed and edu on other management principles for stiff and painful knee.       PLAN/RECOMMENDATIONS:   Plan for treatment: therapy treatment to continue next visit.  S/L STS, split stance balance on foam   Planned interventions for next visit: continue with current treatment.         "

## 2024-03-06 ENCOUNTER — PHYSICAL THERAPY (OUTPATIENT)
Dept: PHYSICAL THERAPY | Facility: REHABILITATION | Age: 74
End: 2024-03-06
Attending: STUDENT IN AN ORGANIZED HEALTH CARE EDUCATION/TRAINING PROGRAM
Payer: MEDICARE

## 2024-03-06 DIAGNOSIS — M25.561 ACUTE PAIN OF BOTH KNEES: ICD-10-CM

## 2024-03-06 DIAGNOSIS — M25.562 ACUTE PAIN OF BOTH KNEES: ICD-10-CM

## 2024-03-06 PROCEDURE — 97110 THERAPEUTIC EXERCISES: CPT

## 2024-03-21 ENCOUNTER — APPOINTMENT (OUTPATIENT)
Dept: PHYSICAL THERAPY | Facility: REHABILITATION | Age: 74
End: 2024-03-21
Attending: STUDENT IN AN ORGANIZED HEALTH CARE EDUCATION/TRAINING PROGRAM
Payer: MEDICARE

## 2024-03-27 ENCOUNTER — APPOINTMENT (OUTPATIENT)
Dept: RADIOLOGY | Facility: OTHER | Age: 74
End: 2024-03-27
Attending: FAMILY MEDICINE
Payer: MEDICARE

## 2024-03-27 ENCOUNTER — OFFICE VISIT (OUTPATIENT)
Dept: SPORTS MEDICINE | Facility: OTHER | Age: 74
End: 2024-03-27
Attending: NURSE PRACTITIONER
Payer: MEDICARE

## 2024-03-27 VITALS
HEIGHT: 74 IN | WEIGHT: 229 LBS | OXYGEN SATURATION: 97 % | TEMPERATURE: 98.5 F | BODY MASS INDEX: 29.39 KG/M2 | DIASTOLIC BLOOD PRESSURE: 80 MMHG | SYSTOLIC BLOOD PRESSURE: 118 MMHG | HEART RATE: 87 BPM | RESPIRATION RATE: 16 BRPM

## 2024-03-27 DIAGNOSIS — M25.561 ACUTE PAIN OF RIGHT KNEE: ICD-10-CM

## 2024-03-27 DIAGNOSIS — M17.12 PRIMARY OSTEOARTHRITIS OF LEFT KNEE: ICD-10-CM

## 2024-03-27 DIAGNOSIS — M25.562 ACUTE PAIN OF LEFT KNEE: ICD-10-CM

## 2024-03-27 RX ORDER — TRIAMCINOLONE ACETONIDE 40 MG/ML
40 INJECTION, SUSPENSION INTRA-ARTICULAR; INTRAMUSCULAR ONCE
Status: COMPLETED | OUTPATIENT
Start: 2024-03-27 | End: 2024-03-27

## 2024-03-27 RX ADMIN — TRIAMCINOLONE ACETONIDE 40 MG: 40 INJECTION, SUSPENSION INTRA-ARTICULAR; INTRAMUSCULAR at 16:47

## 2024-03-27 ASSESSMENT — ENCOUNTER SYMPTOMS
JOINT SWELLING: 1
STIFFNESS: 1

## 2024-03-27 ASSESSMENT — FIBROSIS 4 INDEX: FIB4 SCORE: 2.37

## 2024-03-27 NOTE — PROGRESS NOTES
"Subjective:     Mariusz Jackson is a 74 y.o. male who presents for Knee Pain (Bilateral knee pain )    HPI  Pt presents for evaluation of bilateral knee pain   Pt with bilateral knee pain which started about 3 months ago   Pain started without known fall or injury   Pain currently is worse on the left   Has been attendingi physical therapy and seems to be helping   Pt likes to walk for exercise   Recently had flair of pain a few days ago after a long walk   Pain is still present and worse with bending knee   Cannot bend knee past 90 degrees   Left knee has had some swelling lately     Review of Systems   Musculoskeletal:  Positive for joint pain.   Skin:  Negative for rash.       PMH:  has no past medical history on file.  MEDS:   Current Outpatient Medications:     Probiotic Product (PROBIOTIC PO), Take  by mouth., Disp: , Rfl:     Turmeric (QC TUMERIC COMPLEX PO), Take  by mouth., Disp: , Rfl:     multivitamin Tab, Take 1 Tablet by mouth every day., Disp: , Rfl:   ALLERGIES: No Known Allergies  SURGHX: History reviewed. No pertinent surgical history.  SOCHX:  reports that he has never smoked. He has never used smokeless tobacco. He reports current alcohol use. He reports that he does not use drugs.     Objective:   /80 (BP Location: Left arm, Patient Position: Sitting, BP Cuff Size: Adult)   Pulse 87   Temp 36.9 °C (98.5 °F) (Temporal)   Resp 16   Ht 1.88 m (6' 2\")   Wt 104 kg (229 lb)   SpO2 97%   BMI 29.40 kg/m²     Physical Exam  Constitutional:       General: He is not in acute distress.     Appearance: He is well-developed. He is not diaphoretic.   Pulmonary:      Effort: Pulmonary effort is normal.   Neurological:      Mental Status: He is alert.     Left knee  Appearance - No bruising, erythema, or deformity appreciated  Palpation - No tenderness to palpation along joint lines, patellar tendon, hamstring tendons, or quads.  No palpable effusion.  ROM - FROM extension, flexion limited to 90, " moderate crepitus present  Strength - 5/5 throughout  Neuro - Sensation intact   Special testing - No laxity or pain with varus/valgus stress, neg anterior drawer, neg posterior drawer, neg Lachman's, neg Pedro's, neg patellar apprehension test    Knee injection   First, a verbal consent and a verbal time-out were done, explaining the risks and benefits of the procedure. Then the patient was placed in a seated position.  Anterior lateral joint line portals were identified. The skin was cleaned with alcohol and the clean, no touch technique was used with a 25-gauge 1.5-inch needle. A combination of 5 mL of 1% lidocaine without epinephrine and 1 milliliter of Kenalog 40 mg/mL was injected into the left knee. The patient tolerated the procedure well and there were no immediate post injection complications. Hemostasis was then achieved with gentle pressure and a Band-Aid was placed over the lesion.  Post injection instructions for range of motion, ice, activity modification, signs of infection, emergency precautions were discussed with the patient.    Assessment/Plan:   Assessment    1. Acute pain of left knee  - DX-KNEE COMPLETE 4+ LEFT; Future    2. Acute pain of right knee  - DX-KNEE COMPLETE 4+ RIGHT; Future    3. Primary osteoarthritis of left knee  - triamcinolone acetonide (Kenalog-40) injection 40 mg    Patient with bilateral knee pain secondary to osteoarthritis.  Reviewed multiple treatment approaches.  He is already in physical therapy which I advised is very helpful for this sort of knee pain.  He has worsening pain in the left compared to the right and opted to have an injection in the left knee today.  As above, tolerated well with no acute complication.  Recommended that he finish his last sessions of physical therapy and follow-up in this office on an as-needed basis if wanting to discuss injection on the right knee or if left knee pain is recurring.

## 2024-04-10 ENCOUNTER — RESEARCH ENCOUNTER (OUTPATIENT)
Dept: RESEARCH | Facility: MEDICAL CENTER | Age: 74
End: 2024-04-10
Payer: MEDICARE

## 2024-04-15 ENCOUNTER — RESEARCH ENCOUNTER (OUTPATIENT)
Dept: RESEARCH | Facility: MEDICAL CENTER | Age: 74
End: 2024-04-15
Payer: MEDICARE

## 2024-04-15 ENCOUNTER — RESEARCH ENCOUNTER (OUTPATIENT)
Dept: LAB | Facility: MEDICAL CENTER | Age: 74
End: 2024-04-15
Payer: MEDICARE

## 2024-04-15 DIAGNOSIS — Z00.6 RESEARCH STUDY PATIENT: ICD-10-CM

## 2024-04-15 NOTE — RESEARCH NOTE
Patient has been referred by REGIS Cooley. Sent initial referral follow-up message with instructions to locate and sign consent form(s). The following consent form(s) have been pushed to the patient's MyChart: ISAMAR/CONRAD

## 2024-04-15 NOTE — OP THERAPY DAILY TREATMENT
"  Outpatient Physical Therapy  DAILY TREATMENT     [facility] [place] [address], [address]  [place] [address]  Phone:  [phone]    Date: 04/16/2024  Patient: Mariusz Jackson  YOB: 1950  MRN: [id number]   Time Calculation  Start time: 0945  Stop time: 1030 Time Calculation (min): 45 minutes   Chief Complaint: acute B ant knee pain  Visit #: 15    SUBJECTIVE: Pt indicates he had a flare up to his L knee about a few weeks ago. He went to the MD and had an injection with MD follow up and they did radiographs which he says showed what the doc was expecting with arthritis. This seems to have helped him bend his knee more. He has less issues overall with walking since then too. Not sure what cuased it. Cont to have some indications that he is feeling as though he doesn't want to cause damage so hasn't done weights since. Has never had anything happen like this to date.    Sxs: anterior knee pain B maybe slightly more lateral can get a pop with relief on both knees that feels like kneecap region     Aggs:   -squatting,   -carrying a suticase/etc to step up with anything hurts the knee,  - sometimes doesn't feel like he can pop up if in a deadlift sort of position,   -if sitting a while hard to get up or if really fatigued hard,   -needs to rest after a day on a projects; sometimes bending knee a lot to get off a stool/scaffolding, -walking downhill hurts     OBJECTIVE   Current objective measures:   -No sxs squat today    SL balance with eyes closed 30s  Errors L/R: 10/9    None below:  Knee ROM   0-~10-~120*B    8in Step-up L/R:  */ \"popping\"  on R        Therapeutic Exercises (CPT 26801):     1. 1. UPOC 10/18/23    2. deadlifts 40lbs, 40# 2x15    3. leg press, 8B DL 2x1', not today    4. bike, 3x3' L6.5 circuit, not today    5. HS/quad stretches, x1'ea, not today    6. DKTC, x2', not today    7. bridges, 35# x20, SL 15# x15ea    8. carries, 40# KB  [id number]    9. heel slides, " "x15    10. SL calf raises, x15ea, 2in step, 2up, 1down, not today    12. squat, 3x6 15#, not today    13. S/L Step-ups with ext resistance, Bx15 BTB, not today    15. tandem paloff pres, 2x10ea, on foam PTB    16. SL RDL reaches, 20# 3x12 UE support, not today    18. lat band walks, GTB 2x\"fatigue\", not today      Therapeutic Exercise Summary: HEP: AM/PM stretch, 3+x/week workout  HS stretch x1' seated/knee ext  Quad stretch supine  Ball rolls or bike x2'    carries  Mini lunges  Goblet squat to raised chair 15# 2x15 or air squat  Bridges DL 2x20 20#-40#; SL 15#  Lat band walks GTB 2xfatigue x45\"H  SL calf raises off 2in platform DL up; SL down  Deadlifts 40bs   Leg press and bike x3' circuit      Therapeutic Treatments and Modalities:     1. Manual Therapy (CPT 52840), prox fib mog GIII, manual rectus stretch  Time-based treatments/modalities:  Physical Therapy Timed Treatment Charges  Manual therapy minutes (CPT 65721): 8 minutes  Therapeutic exercise minutes (CPT 52205): 35 minutes  Assessment: pt with knee pain with flexion; ROM approx 118 and improved to 122 post manual and self mobility. Edu on what to do with this with flare up in future. He did well in session with HEP; we regressed squats today with recent flare up but encouraged resumption of weight training. Will check in to progress pt in future as he has had a setback in his overall progress. Goal is to get to an indep HEP he can progress/regress based off his knee pain overall.      PLAN/RECOMMENDATIONS:   Plan for treatment: therapy treatment to continue next visit.  S/L STS, split stance balance on foam   Planned interventions for next visit: continue with current treatment.         "

## 2024-04-16 ENCOUNTER — PHYSICAL THERAPY (OUTPATIENT)
Dept: PHYSICAL THERAPY | Facility: REHABILITATION | Age: 74
End: 2024-04-16
Attending: STUDENT IN AN ORGANIZED HEALTH CARE EDUCATION/TRAINING PROGRAM
Payer: MEDICARE

## 2024-04-16 ENCOUNTER — RESEARCH ENCOUNTER (OUTPATIENT)
Dept: LAB | Facility: MEDICAL CENTER | Age: 74
End: 2024-04-16
Payer: MEDICARE

## 2024-04-16 DIAGNOSIS — Z00.6 RESEARCH STUDY PATIENT: ICD-10-CM

## 2024-04-16 DIAGNOSIS — M25.561 ACUTE PAIN OF BOTH KNEES: ICD-10-CM

## 2024-04-16 DIAGNOSIS — M25.562 ACUTE PAIN OF BOTH KNEES: ICD-10-CM

## 2024-04-16 PROCEDURE — 97140 MANUAL THERAPY 1/> REGIONS: CPT

## 2024-04-16 PROCEDURE — 97110 THERAPEUTIC EXERCISES: CPT

## 2024-04-16 NOTE — RESEARCH NOTE
Patient has been referred by REGIS oColey. Sent initial referral follow-up message with instructions to locate and sign consent form(s). The following consent form(s) have been pushed to the patient's MyChart: ISAMAR/CONRAD

## 2024-04-16 NOTE — OP THERAPY PROGRESS SUMMARY
Outpatient Physical Therapy  PROGRESS SUMMARY NOTE      [facility] [place]  [address], [address]  [place] [address]  Phone:  [phone]    Date of Visit: 04/16/2024    Patient: Mariusz Jackson  YOB: 1950  MRN: [id number]     Referring Provider: Celio Santiago P.A.-C.  [address] [place] [address]   Referring Diagnosis Pain in right knee [M25.561];Pain in left knee [M25.562]     Visit Diagnoses     ICD-10-CM   1. Acute pain of both knees  M25.561    M25.562       Rehab Potential: good    Progress Report Period: 26-4/16/24    Functional Assessment Used          Objective Findings and Assessment:   Patient progression towards goals: Pt had a recent flare up that has derailed some of his recent progress. Due to the nature we will check in to progress pt in future as he has had a setback in his overall progress. Goal is to get to an indep HEP he can progress/regress based off his knee pain overall. He was also advised to cont PCP and MD f/u as he just visited with sports med at [facility] clinic. Pt in agreement to plan    Objective findings and assessment details: See visit 4/16/24; regressed d/t pain with flexion in knee today 4/16/24    Goals:   Short Term Goals:   Short Term Goals:   -pt meets MCID for LEFS (MET)  -pt works in yard/garden or does projects with minor sxs during and into evening (MET)  -pt walks up/down steps confidently and with minor sxs at most (100% MET)  -pt without giving out at knee functionally and has beginning HEP for strength (100% MET)              Short term goal time span:  2-4 weeks      Long Term Goals:       -pt meets MCID for LEFS (MET)  -pt works in yard/garden or does projects with minor sxs during and no pain after (NOT MET)  -pt walks up/down steps confidently and without sxs >75% of the time (50% MET)  -pt with HEP of self edu on how to progress/regress on own (70% MET)  -pt without giving out at knee functionally and HEP for  strength maintenance (MET)   -pt will perform S/L balance with eyes closed for 30s with less than 5 errors within 4 sessions to improve LE stability and balance. (NOT MET)  Long term goal time span:  6-8 weeks    Plan:   Planned therapy interventions:  E Stim Attended (CPT 61665), E Stim Unattended (CPT 74171), Hot or Cold Pack Therapy (CPT 94280), Manual Therapy (CPT 44709), Mechanical Traction (CPT 32746), Neuromuscular Re-education (CPT 63582), Therapeutic Exercise (CPT 34459) and Therapeutic Activities (CPT 97181)  Frequency:  1x week  Duration in weeks:  12  Duration in visits:  4      Referring provider co-signature:  I have reviewed this plan of care and my co-signature certifies the need for services.     Certification Period: 04/16/2024 to 06/18/24    Physician Signature: ________________________________ Date: ______________

## 2024-04-17 ENCOUNTER — HOSPITAL ENCOUNTER (OUTPATIENT)
Dept: LAB | Facility: MEDICAL CENTER | Age: 74
End: 2024-04-17
Attending: NURSE PRACTITIONER
Payer: MEDICARE

## 2024-04-17 DIAGNOSIS — Z00.6 RESEARCH STUDY PATIENT: ICD-10-CM

## 2024-04-22 LAB
ELF SCORE: 10.24 - (ref 9.8–11.3)
RELATIVE RISK: ABNORMAL
RISK GROUP: ABNORMAL
RISK: 23.6 %

## 2024-05-01 ENCOUNTER — APPOINTMENT (OUTPATIENT)
Dept: SPORTS MEDICINE | Facility: OTHER | Age: 74
End: 2024-05-01
Payer: MEDICARE

## 2024-05-01 VITALS
HEART RATE: 93 BPM | BODY MASS INDEX: 29.39 KG/M2 | DIASTOLIC BLOOD PRESSURE: 78 MMHG | OXYGEN SATURATION: 96 % | SYSTOLIC BLOOD PRESSURE: 118 MMHG | WEIGHT: 229 LBS | TEMPERATURE: 97.6 F | HEIGHT: 74 IN | RESPIRATION RATE: 16 BRPM

## 2024-05-01 DIAGNOSIS — M25.561 ACUTE PAIN OF RIGHT KNEE: ICD-10-CM

## 2024-05-01 PROCEDURE — 3078F DIAST BP <80 MM HG: CPT | Performed by: FAMILY MEDICINE

## 2024-05-01 PROCEDURE — 3074F SYST BP LT 130 MM HG: CPT | Performed by: FAMILY MEDICINE

## 2024-05-01 PROCEDURE — 99213 OFFICE O/P EST LOW 20 MIN: CPT | Performed by: FAMILY MEDICINE

## 2024-05-01 ASSESSMENT — FIBROSIS 4 INDEX: FIB4 SCORE: 2.37

## 2024-05-01 ASSESSMENT — ENCOUNTER SYMPTOMS: FEVER: 0

## 2024-05-01 NOTE — PROGRESS NOTES
"Subjective:     Mariusz Jackson is a 74 y.o. male who presents for Knee Pain (R knee pain )    HPI  Pt presents for evaluation of an acute problem  Pt with worsening right knee pain   Pain started without fall or injury about a week ago   Had severe pain when trying to bend the knee   Had difficulties walking for a few days  Has actually made great improvement since that flare and feels that his knee is feeling much better  Has minimal knee pain currently and very happy with his progress  Never had swelling of the knee  Never had redness of the knee    Review of Systems   Constitutional:  Negative for fever.   Skin:  Negative for rash.       PMH:  has no past medical history on file.  MEDS:   Current Outpatient Medications:     Probiotic Product (PROBIOTIC PO), Take  by mouth., Disp: , Rfl:     Turmeric (QC TUMERIC COMPLEX PO), Take  by mouth., Disp: , Rfl:     multivitamin Tab, Take 1 Tablet by mouth every day., Disp: , Rfl:   ALLERGIES: No Known Allergies  SURGHX: History reviewed. No pertinent surgical history.  SOCHX:  reports that he has never smoked. He has never used smokeless tobacco. He reports current alcohol use. He reports that he does not use drugs.     Objective:   /78 (BP Location: Left arm, Patient Position: Sitting, BP Cuff Size: Large adult)   Pulse 93   Temp 36.4 °C (97.6 °F) (Temporal)   Resp 16   Ht 1.88 m (6' 2\")   Wt 104 kg (229 lb)   SpO2 96%   BMI 29.40 kg/m²     Physical Exam  Constitutional:       General: He is not in acute distress.     Appearance: He is well-developed. He is not diaphoretic.   Pulmonary:      Effort: Pulmonary effort is normal.   Neurological:      Mental Status: He is alert.     Right knee  Appearance - No bruising, erythema, or deformity appreciated  Palpation - No tenderness to palpation along joint lines, patellar tendon, hamstring tendons, or quads.  No palpable effusion.  ROM - FROM with crepitus   Strength - 5/5 throughout  Neuro - Sensation " intact  Special testing - No laxity or pain with varus/valgus stress, neg anterior drawer, neg posterior drawer, neg Lachman's    Assessment/Plan:   Assessment    1. Acute pain of right knee    Patient with acute right knee pain which started about a week ago.  Initially, the pain was severe, but he has actually made great improvements and feels that his function is back to baseline.  Based on his history, this sounds fairly consistent with gout, but could be acute flare of osteoarthritis or other problem.  Because the problem has fully resolved, did not recommend any acute treatment.  However, if this happens again, would like to see him same day or next day to evaluate the knee when it is at its worst.  Could possibly do aspiration at that time to better evaluate why this is happening.  Patient was given phone number for office and will call if he has another similar flare in the future.

## 2024-05-01 NOTE — LETTER
"    May 1, 2024        Short-term  Staying off the knee somewhat, and avoiding heavy lifting and heavy exercise can be beneficial    Mild activity such as going for walks and range of motion exercises are good    Ice will help temporarily    Knee braces help temporarily (these do not need to be heavy knee braces and could be a simple compression wrap)    Topical diclofenac cream or capsaicin ointment will both make the knee feel better    Injection into the knee with steroids could help greatly.  This is sometimes a one time thing, and sometimes becomes something done more regularly.        Long-term  Regular exercise to keep the knee muscles strong without doing types of exercise which exacerbate the pain.  The best types of exercises are smooth motions and with minimal pounding.  Running and jumping will make the pain worse.  Cycling and pool exercise are fantastic.    Weight loss is helpful (if your BMI is >30), as it decreases pressure on the knee.  This does not need to be large amount of weight loss, just 5-10% of weight decrease can make dramatic differences.      Working with the physical therapist for a short time can be greatly beneficial    Some people need intermittent injections to help knee pain long-term, sometimes these are steroid (\"cortisone\") shots, and sometimes they are something else called hyaluronic acid (Euflexxa, Synvisc, Orthovisc, Hyalgan)       Kristopher Vargas M.D.  [phone]                  "

## 2024-05-02 ENCOUNTER — OFFICE VISIT (OUTPATIENT)
Dept: MEDICAL GROUP | Facility: PHYSICIAN GROUP | Age: 74
End: 2024-05-02
Payer: MEDICARE

## 2024-05-02 VITALS
HEART RATE: 64 BPM | WEIGHT: 227.6 LBS | OXYGEN SATURATION: 96 % | SYSTOLIC BLOOD PRESSURE: 122 MMHG | BODY MASS INDEX: 29.21 KG/M2 | HEIGHT: 74 IN | TEMPERATURE: 98.5 F | DIASTOLIC BLOOD PRESSURE: 66 MMHG

## 2024-05-02 DIAGNOSIS — G89.29 CHRONIC PAIN OF BOTH KNEES: ICD-10-CM

## 2024-05-02 DIAGNOSIS — G56.03 BILATERAL CARPAL TUNNEL SYNDROME: ICD-10-CM

## 2024-05-02 DIAGNOSIS — R06.83 SNORING: ICD-10-CM

## 2024-05-02 DIAGNOSIS — R35.1 NOCTURIA: ICD-10-CM

## 2024-05-02 DIAGNOSIS — M25.562 CHRONIC PAIN OF BOTH KNEES: ICD-10-CM

## 2024-05-02 DIAGNOSIS — E78.00 HYPERCHOLESTEROLEMIA: ICD-10-CM

## 2024-05-02 DIAGNOSIS — Z00.00 ENCOUNTER FOR MEDICARE ANNUAL WELLNESS EXAM: Primary | ICD-10-CM

## 2024-05-02 DIAGNOSIS — G89.29 CHRONIC LOW BACK PAIN WITHOUT SCIATICA, UNSPECIFIED BACK PAIN LATERALITY: ICD-10-CM

## 2024-05-02 DIAGNOSIS — M25.561 CHRONIC PAIN OF BOTH KNEES: ICD-10-CM

## 2024-05-02 DIAGNOSIS — M54.50 CHRONIC LOW BACK PAIN WITHOUT SCIATICA, UNSPECIFIED BACK PAIN LATERALITY: ICD-10-CM

## 2024-05-02 DIAGNOSIS — R79.89 ABNORMAL LIVER FUNCTION TESTS: ICD-10-CM

## 2024-05-02 LAB
APPEARANCE UR: CLEAR
BILIRUB UR STRIP-MCNC: NEGATIVE MG/DL
COLOR UR AUTO: YELLOW
GLUCOSE UR STRIP.AUTO-MCNC: NEGATIVE MG/DL
KETONES UR STRIP.AUTO-MCNC: NEGATIVE MG/DL
LEUKOCYTE ESTERASE UR QL STRIP.AUTO: NEGATIVE
NITRITE UR QL STRIP.AUTO: NEGATIVE
PH UR STRIP.AUTO: 5.5 [PH] (ref 5–8)
PROT UR QL STRIP: NEGATIVE MG/DL
RBC UR QL AUTO: NORMAL
SP GR UR STRIP.AUTO: 1.02
UROBILINOGEN UR STRIP-MCNC: 0.2 MG/DL

## 2024-05-02 PROCEDURE — 3078F DIAST BP <80 MM HG: CPT | Performed by: NURSE PRACTITIONER

## 2024-05-02 PROCEDURE — 3074F SYST BP LT 130 MM HG: CPT | Performed by: NURSE PRACTITIONER

## 2024-05-02 PROCEDURE — 81002 URINALYSIS NONAUTO W/O SCOPE: CPT | Performed by: NURSE PRACTITIONER

## 2024-05-02 PROCEDURE — G0439 PPPS, SUBSEQ VISIT: HCPCS | Performed by: NURSE PRACTITIONER

## 2024-05-02 RX ORDER — TAMSULOSIN HYDROCHLORIDE 0.4 MG/1
0.4 CAPSULE ORAL
Qty: 30 CAPSULE | Refills: 1 | Status: SHIPPED | OUTPATIENT
Start: 2024-05-02

## 2024-05-02 RX ORDER — GLUCOSAMINE/CHONDR SU A SOD 750-600 MG
TABLET ORAL
COMMUNITY
Start: 2024-04-20

## 2024-05-02 ASSESSMENT — PATIENT HEALTH QUESTIONNAIRE - PHQ9: CLINICAL INTERPRETATION OF PHQ2 SCORE: 0

## 2024-05-02 ASSESSMENT — ACTIVITIES OF DAILY LIVING (ADL): BATHING_REQUIRES_ASSISTANCE: 0

## 2024-05-02 ASSESSMENT — FIBROSIS 4 INDEX: FIB4 SCORE: 2.37

## 2024-05-02 ASSESSMENT — ENCOUNTER SYMPTOMS: GENERAL WELL-BEING: GOOD

## 2024-05-02 NOTE — PROGRESS NOTES
Chief Complaint   Patient presents with    Medicare Annual Wellness     HPI:  Mariusz Jackson is a 74 y.o. here for Medicare Annual Wellness Visit     Patient Active Problem List    Diagnosis Date Noted    Snoring 05/02/2024    Chronic pain of both knees 05/24/2023    Bilateral carpal tunnel syndrome 05/24/2023    Other male erectile dysfunction 04/19/2022    Hypercholesterolemia 04/19/2022    History of laminectomy 10/19/2021    Chronic lumbar pain 06/19/2019     Current Outpatient Medications   Medication Sig Dispense Refill    Glucosamine HCl 1500 MG Tab       tamsulosin (FLOMAX) 0.4 MG capsule Take 1 Capsule by mouth 1/2 hour after breakfast. 30 Capsule 1    Probiotic Product (PROBIOTIC PO) Take  by mouth.      Turmeric (QC TUMERIC COMPLEX PO) Take  by mouth.      multivitamin Tab Take 1 Tablet by mouth every day.       No current facility-administered medications for this visit.          Current supplements as per medication list.     Allergies: Patient has no known allergies.    Current social contact/activities: visiting with friends/family/ neighborhood meetings.      He  reports that he has never smoked. He has never used smokeless tobacco. He reports current alcohol use. He reports that he does not use drugs.  Counseling given: Not Answered    ROS:    Gait: Uses no assistive device  Ostomy: No  Other tubes: No  Amputations: No  Chronic oxygen use: No  Last eye exam: about 6 months ago   Wears hearing aids: No   : Reports urinary leakage during the last 6 months that has not interfered at all with their daily activities or sleep.    Screening:    Depression Screening  Little interest or pleasure in doing things?  0 - not at all  Feeling down, depressed , or hopeless? 0 - not at all  Trouble falling or staying asleep, or sleeping too much?     Feeling tired or having little energy?     Poor appetite or overeating?     Feeling bad about yourself - or that you are a failure or have let yourself or your  family down?    Trouble concentrating on things, such as reading the newspaper or watching television?    Moving or speaking so slowly that other people could have noticed.  Or the opposite - being so fidgety or restless that you have been moving around a lot more than usual?     Thoughts that you would be better off dead, or of hurting yourself?     Patient Health Questionnaire Score:      If depressive symptoms identified deferred to follow up visit unless specifically addressed in assessment and plan.    Interpretation of PHQ-9 Total Score   Score Severity   1-4 No Depression   5-9 Mild Depression   10-14 Moderate Depression   15-19 Moderately Severe Depression   20-27 Severe Depression    Screening for Cognitive Impairment  Do you or any of your friends or family members have any concern about your memory? No  Three Minute Recall (Leader, Season, Table) 2/3    Jesús clock face with all 12 numbers and set the hands to show 10 minutes after 11.  Yes    Cognitive concerns identified deferred for follow up unless specifically addressed in assessment and plan.    Fall Risk Assessment  Has the patient had two or more falls in the last year or any fall with injury in the last year?  No    Safety Assessment  Do you always wear your seatbelt?  Yes  Any changes to home needed to function safely? No  Difficulty hearing.  No  Patient counseled about all safety risks that were identified.    Functional Assessment ADLs  Are there any barriers preventing you from cooking for yourself or meeting nutritional needs?  No.    Are there any barriers preventing you from driving safely or obtaining transportation?  No.    Are there any barriers preventing you from using a telephone or calling for help?  No    Are there any barriers preventing you from shopping?  No.    Are there any barriers preventing you from taking care of your own finances?  No    Are there any barriers preventing you from managing your medications?  No    Are  there any barriers preventing you from showering, bathing or dressing yourself? No    Are there any barriers preventing you from doing housework or laundry? No    Are there any barriers preventing you from using the toilet?No    Are you currently engaging in any exercise or physical activity?  Yes.      Self-Assessment of Health  What is your perception of your health? Good    Do you sleep more than six hours a night? Yes    In the past 7 days, how much did pain keep you from doing your normal work? Some    Do you spend quality time with family or friends (virtually or in person)? Yes    Do you usually eat a heart healthy diet that constists of a variety of fruits, vegetables, whole grains and fiber? Yes    Do you eat foods high in fat and/or Fast Food more than three times per week? No    How concerned are you that your medical conditions are not being well managed? a little    Are you worried that in the next 2 months, you may not have stable housing that you own, rent, or stay in as part of a household? No      Advance Care Planning  Do you have an Advance Directive, Living Will, Durable Power of , or POLST? Yes  Advance Directive Living Will Durable Power of  POLST is not on file - instructed patient to bring in a copy to scan into their chart    Health Maintenance Summary            Annual Wellness Visit (Yearly) Next due on 5/2/2025 05/02/2024  Visit Dx: Encounter for Medicare annual wellness exam    05/02/2024  Level of Service: ANNUAL WELLNESS VISIT-INCLUDES PPPS SUBSEQUE*    04/19/2022  Level of Service: IA ANNUAL WELLNESS VISIT-INCLUDES PPPS SUBSEQUE*    08/13/2020                IMM DTaP/Tdap/Td Vaccine (2 - Td or Tdap) Next due on 11/13/2025 11/13/2015  Imm Admin: Tdap Vaccine              Pneumococcal Vaccine: 65+ Years (Series Information) Completed      08/13/2020  Imm Admin: Pneumococcal polysaccharide vaccine (PPSV-23)    11/13/2015  Imm Admin: Pneumococcal Conjugate  Vaccine (Prevnar/PCV-13)              Zoster (Shingles) Vaccines (Series Information) Completed      2021  Imm Admin: Zoster Vaccine Recombinant (RZV) (SHINGRIX)    2020  Imm Admin: Zoster Vaccine Recombinant (RZV) (SHINGRIX)              Hepatitis C Screening  Completed      2022  Hepatitis C Antibody component of HCV Scrn ( [id number] 1xLife)    2022  Done              Influenza Vaccine (Series Information) Completed      2023  Imm Admin: Influenza Vaccine Adult HD    2022  Imm Admin: Influenza Vaccine, Quadrivalent, Adjuvanted (Pf)    10/19/2021  Imm Admin: Influenza Vaccine Adult HD    10/05/2020  Imm Admin: Influenza Vaccine Adult HD    10/27/2019  Imm Admin: Influenza Vaccine Adult HD    Only the first 5 history entries have been loaded, but more history exists.              COVID-19 Vaccine (Series Information) Completed      2023  Outside Immunization: COVID-19(MOD) 12yrs \T\ up    2022  Imm Admin: MODERNA BIVALENT BOOSTER SARS-COV-2 VACCINE (6+)    2021  Outside Immunization: COVID-19 mRNA (MOD)    2021  Imm Admin: MODERNA SARS-COV-2 VACCINE (12+)    2021  Imm Admin: MODERNA SARS-COV-2 VACCINE (12+)    Only the first 5 history entries have been loaded, but more history exists.              Hepatitis A Vaccine (Hep A) (Series Information) Aged Out      No completion history exists for this topic.              Hepatitis B Vaccine (Hep B) (Series Information) Aged Out      No completion history exists for this topic.              HPV Vaccines (Series Information) Aged Out      No completion history exists for this topic.              Polio Vaccine (Inactivated Polio) (Series Information) Aged Out      No completion history exists for this topic.              Meningococcal Immunization (Series Information) Aged Out      No completion history exists for this topic.              Discontinued - Colorectal Cancer Screening  Discontinued         "Frequency changed to Never automatically (Topic No Longer Applies)    10/19/2021  Colonoscopy (Done)                  Patient Care Team:  REGIS Cooley as PCP - General (Nurse Practitioner Family)  Nick Lovett PT as Physical Therapist (Physical Therapy)    Social History     Tobacco Use    Smoking status: Never    Smokeless tobacco: Never   Vaping Use    Vaping Use: Never used   Substance Use Topics    Alcohol use: Yes     Comment: occ    Drug use: Never     History reviewed. No pertinent family history.  He  has no past medical history on file.   History reviewed. No pertinent surgical history.    Exam:   /66 (BP Location: Right arm, Patient Position: Sitting, BP Cuff Size: Adult)   Pulse 64   Temp 36.9 °C (98.5 °F) (Temporal)   Ht 1.88 m (6' 2\")   Wt 103 kg (227 lb 9.6 oz)   SpO2 96%  Body mass index is 29.22 kg/m².    Hearing excellent.    Dentition good  Alert, oriented in no acute distress.  Eye contact is good, speech goal directed, affect calm    Assessment and Plan. The following treatment and monitoring plan is recommended:      1. Encounter for Medicare annual wellness exam  Mariusz is a pleasant 74 year old man here for his Medicare Annual Wellness exam. He feels well and denies complaints.     2. Hypercholesterolemia  This is chronic and stable. Labs done in 01/2024 showed elevated total and LDL cholesterol, 216 and 129 respectively. We will continue to monitor.    3. Chronic low back pain without sciatica, unspecified back pain laterality  This is a chronic and stable condition. His back pain is managed by activity modification, limiting excessive activity or prolonged periods of sitting.     4. Chronic pain of both knees  This is a chronic and stable condition. He is followed by sports medicine and physical therapy. He received a left steroid knee injection in 03/2024 with good benefit.     5. Nocturia  This is a chronic condition. He has never sought treatment for " nocturia, weak urinary stream and post void dribbling. His POC UA revealed trace blood, but was otherwise normal. We can consider repeating a UA at his next visit. Otherwise, we will trial Tamsulosin to help treat his symptoms.   - tamsulosin (FLOMAX) 0.4 MG capsule; Take 1 Capsule by mouth 1/2 hour after breakfast.  Dispense: 30 Capsule; Refill: 1  - POCT Urinalysis    6. Snoring  This is chronic and has not been investigated. His STOP BANG score is 4. We will investigate further with a sleep study.   - Overnight Home Sleep Study; Future    7. Bilateral carpal tunnel syndrome  This is a chronic and stable condition. He has been using bilateral wrist braces nocturnally with good benefit. His symptoms of numbness are no longer continuous as they previously were.     8. Abnormal liver function tests  - HEP B SURFACE ANTIGEN; Future  - ALPHA-1 ANTITRYPSIN PHENOTYPE; Future  - IRON/TOTAL IRON BIND; Future  - DAMIAN REFLEXIVE PROFILE; Future  - US-RUQ; Future      Services suggested: No services needed at this time  Health Care Screening: Age-appropriate preventive services recommended by USPTF and ACIP covered by Medicare were discussed today. Services ordered if indicated and agreed upon by the patient.  Referrals offered: Community-based lifestyle interventions to reduce health risks and promote self-management and wellness, fall prevention, nutrition, physical activity, tobacco-use cessation, weight loss, and mental health services as per orders if indicated.    Discussion today about general wellness and lifestyle habits:    Prevent falls and reduce trip hazards; Cautioned about securing or removing rugs.  Have a working fire alarm and carbon monoxide detector;   Engage in regular physical activity and social activities     Follow-up: Return in about 8 weeks (around 6/27/2024) for establish care and follow up on nocturia.

## 2024-05-03 ENCOUNTER — DOCUMENTATION (OUTPATIENT)
Dept: HEALTH INFORMATION MANAGEMENT | Facility: OTHER | Age: 74
End: 2024-05-03
Payer: MEDICARE

## 2024-05-12 LAB
APOB+LDLR+PCSK9 GENE MUT ANL BLD/T: NOT DETECTED
BRCA1+BRCA2 DEL+DUP + FULL MUT ANL BLD/T: NOT DETECTED
MLH1+MSH2+MSH6+PMS2 GN DEL+DUP+FUL M: NOT DETECTED

## 2024-05-14 ENCOUNTER — TELEPHONE (OUTPATIENT)
Dept: HEALTH INFORMATION MANAGEMENT | Facility: OTHER | Age: 74
End: 2024-05-14

## 2024-05-14 NOTE — OP THERAPY DAILY TREATMENT
"  Outpatient Physical Therapy  DAILY TREATMENT     [facility] Outpatient Physical Therapy [place] [id number] [facility], [address]  [place] [address]  Phone:  [phone]    Date: 05/15/2024  Patient: Mariusz Jackson  YOB: 1950  MRN: [id number]   Time Calculation           Chief Complaint: acute B ant knee pain  Visit #: 16    SUBJECTIVE: Pt indicates that he has been having on/off knee pain. He notes that he had a flare up of R knee pain that he thought was gout from MD. The pain was gone after 30 hours. Overall he feels that things have improved some in B legs.     Sxs: anterior knee pain B maybe slightly more lateral can get a pop with relief on both knees that feels like kneecap region     Aggs:   -squatting,   -carrying a suticase/etc to step up with anything hurts the knee,  -twisting the knee if walking in garden sudden quick pain  - sometimes doesn't feel like he can pop up if in a deadlift sort of position,   -if sitting a while hard to get up or if really fatigued hard,   -needs to rest after a day on a projects; sometimes bending knee a lot to get off a stool/scaffolding,   -walking downhill hurts     OBJECTIVE   Current objective measures:   -No sxs squat today    SL balance with eyes closed 30s  Errors L/R: 10/9    None below:  Knee ROM   0-~10-~120*B    8in Step-up L/R:  */ \"popping\"  on R        Therapeutic Exercises (CPT 93236):     1. 1. UPOC 6/18/23    2. deadlifts 40lbs, 40# 2x15    3. leg press, 8B DL 2x1', not today    4. bike, 3x3' L6.5 circuit, not today    5. HS/quad stretches, x1'ea, not today    6. DKTC, x2', not today    7. bridges, 35# x20, SL 15# x15ea    8. carries, 40# KB  [id number]    9. heel slides, x15    10. SL calf raises, x15ea, 2in step, 2up, 1down, not today    12. squat, 3x6 15#, staggered sit to stand    13. S/L Step-ups with ext resistance, Bx15 BTB, not today    15. tandem paloff pres, 2x10ea, on foam PTB    16. SL RDL reaches, 20# 3x12 UE support, not today    18. lat band " "walks, GTB 2x\"fatigue\", not today      Therapeutic Exercise Summary: HEP: AM/PM stretch, 3+x/week workout  HS stretch x1' seated/knee ext  Quad stretch supine  Ball rolls or bike x2'  Carries   Mini lunges  Goblet squat to raised chair 15# 2x15 or air squat  Bridges DL 2x20 20#-40#; SL 15#  Lat band walks GTB 2xfatigue x45\"H  SL calf raises off 2in platform DL up; SL down  Deadlifts 40bs   Leg press and bike x3' circuit    Edu on scaling exercises 5/15/24      Therapeutic Treatments and Modalities:     1. Manual Therapy (CPT 06676), prox fib mog GIII, manual rectus stretch  Time-based treatments/modalities:     Assessment: Pt with continued pain with significant load with end range knee flexion and end range squatting. We mostly discussed scaling exercises based off pain/irritability of tissue today. Plan to go over this next time and pot DC to HEP based off how pt is doing. He is in agreement to this plan.    PLAN/RECOMMENDATIONS:   Plan for treatment: therapy treatment to continue next visit.  S/L STS, split stance balance on foam   Planned interventions for next visit: continue with current treatment.         "

## 2024-05-15 ENCOUNTER — PHYSICAL THERAPY (OUTPATIENT)
Dept: PHYSICAL THERAPY | Facility: REHABILITATION | Age: 74
End: 2024-05-15
Attending: STUDENT IN AN ORGANIZED HEALTH CARE EDUCATION/TRAINING PROGRAM
Payer: MEDICARE

## 2024-05-15 DIAGNOSIS — M25.561 ACUTE PAIN OF BOTH KNEES: ICD-10-CM

## 2024-05-15 DIAGNOSIS — M25.562 ACUTE PAIN OF BOTH KNEES: ICD-10-CM

## 2024-05-28 ENCOUNTER — HOSPITAL ENCOUNTER (OUTPATIENT)
Dept: RADIOLOGY | Facility: MEDICAL CENTER | Age: 74
End: 2024-05-28
Attending: NURSE PRACTITIONER
Payer: MEDICARE

## 2024-05-28 DIAGNOSIS — R79.89 ABNORMAL LIVER FUNCTION TESTS: ICD-10-CM

## 2024-06-12 ENCOUNTER — PHYSICAL THERAPY (OUTPATIENT)
Dept: PHYSICAL THERAPY | Facility: REHABILITATION | Age: 74
End: 2024-06-12
Attending: STUDENT IN AN ORGANIZED HEALTH CARE EDUCATION/TRAINING PROGRAM
Payer: MEDICARE

## 2024-06-12 DIAGNOSIS — M25.562 ACUTE PAIN OF BOTH KNEES: ICD-10-CM

## 2024-06-12 DIAGNOSIS — M25.561 ACUTE PAIN OF BOTH KNEES: ICD-10-CM

## 2024-06-12 PROCEDURE — 97110 THERAPEUTIC EXERCISES: CPT

## 2024-06-12 NOTE — OP THERAPY DAILY TREATMENT
"  Outpatient Physical Therapy  DAILY TREATMENT     [facility] Outpatient Physical Therapy [place] [address] [facility], [address]  [place] [address]  Phone:  [phone]    Date: 06/12/2024    Patient: Mariusz Jackson  YOB: 1950  MRN: [id number]     Time Calculation    Start time: 1100  Stop time: 1145 Time Calculation (min): 45 minutes         Chief Complaint: knee pain   Visit #: 17    SUBJECTIVE:  Pt continues to have some flareups in his knees depending on his ADL's. He's been compliant with daily stretching, squats and 2-3x/week he will go to the gym and do some heavier weights. He avoids walking on inclines or declines. Today he is feeling good with minimal pain. Pt states he's feeling like he can be DC'd after this visit.     OBJECTIVE:    AROM 0-118 Bilaterally.     8 inch step mild pain on left.   SLS eyes closed 8-10 seconds.       Therapeutic Exercises (CPT 27064):     1. 1. UPOC 6/18/23    2. deadlifts 40lbs, 40# 2x15    3. leg press, 8B DL 2x1'    4. bike, 3x3' L6.5 circuit, not today    5. HS/quad stretches, x1'ea, not today    6. DKTC, x2', not today    7. bridges, 35# x20, SL 15# x15ea    8. carries, 40# KB  [id number]    9. heel slides, x15    10. SL calf raises, x15ea, 2in step, 2up, 1down, not today    12. squat, 3x6 15#, staggered sit to stand    13. S/L Step-ups with ext resistance, Bx15 BTB, not today    15. tandem paloff pres, 2x10ea, on foam PTB    18. lat band walks, GTB 2x\"fatigue\", not today      Time-based treatments/modalities:    Physical Therapy Timed Treatment Charges  Therapeutic exercise minutes (CPT 23687): 45 minutes        ASSESSMENT:   Response to treatment: pt is doing well overall and has met most of his goals: except for SLS with eyes closed is challanging.     PLAN/RECOMMENDATIONS:   Plan for treatment: discharge patient due to accomplished goals.           "

## 2024-06-17 SDOH — ECONOMIC STABILITY: FOOD INSECURITY: WITHIN THE PAST 12 MONTHS, YOU WORRIED THAT YOUR FOOD WOULD RUN OUT BEFORE YOU GOT MONEY TO BUY MORE.: NEVER TRUE

## 2024-06-17 SDOH — ECONOMIC STABILITY: HOUSING INSECURITY

## 2024-06-17 SDOH — HEALTH STABILITY: MENTAL HEALTH
STRESS IS WHEN SOMEONE FEELS TENSE, NERVOUS, ANXIOUS, OR CAN'T SLEEP AT NIGHT BECAUSE THEIR MIND IS TROUBLED. HOW STRESSED ARE YOU?: ONLY A LITTLE

## 2024-06-17 SDOH — HEALTH STABILITY: PHYSICAL HEALTH: ON AVERAGE, HOW MANY DAYS PER WEEK DO YOU ENGAGE IN MODERATE TO STRENUOUS EXERCISE (LIKE A BRISK WALK)?: 4 DAYS

## 2024-06-17 SDOH — ECONOMIC STABILITY: INCOME INSECURITY: IN THE LAST 12 MONTHS, WAS THERE A TIME WHEN YOU WERE NOT ABLE TO PAY THE MORTGAGE OR RENT ON TIME?: NO

## 2024-06-17 SDOH — HEALTH STABILITY: PHYSICAL HEALTH: ON AVERAGE, HOW MANY MINUTES DO YOU ENGAGE IN EXERCISE AT THIS LEVEL?: 30 MIN

## 2024-06-17 SDOH — ECONOMIC STABILITY: FOOD INSECURITY: WITHIN THE PAST 12 MONTHS, THE FOOD YOU BOUGHT JUST DIDN'T LAST AND YOU DIDN'T HAVE MONEY TO GET MORE.: NEVER TRUE

## 2024-06-17 SDOH — ECONOMIC STABILITY: INCOME INSECURITY: HOW HARD IS IT FOR YOU TO PAY FOR THE VERY BASICS LIKE FOOD, HOUSING, MEDICAL CARE, AND HEATING?: NOT HARD AT ALL

## 2024-06-17 ASSESSMENT — SOCIAL DETERMINANTS OF HEALTH (SDOH)
IN A TYPICAL WEEK, HOW MANY TIMES DO YOU TALK ON THE PHONE WITH FAMILY, FRIENDS, OR NEIGHBORS?: THREE TIMES A WEEK
IN A TYPICAL WEEK, HOW MANY TIMES DO YOU TALK ON THE PHONE WITH FAMILY, FRIENDS, OR NEIGHBORS?: THREE TIMES A WEEK
HOW OFTEN DO YOU ATTENT MEETINGS OF THE CLUB OR ORGANIZATION YOU BELONG TO?: MORE THAN 4 TIMES PER YEAR
IN THE PAST 12 MONTHS, HAS THE ELECTRIC, GAS, OIL, OR WATER COMPANY THREATENED TO SHUT OFF SERVICE IN YOUR HOME?: NO
HOW OFTEN DO YOU GET TOGETHER WITH FRIENDS OR RELATIVES?: TWICE A WEEK
WITHIN THE PAST 12 MONTHS, YOU WORRIED THAT YOUR FOOD WOULD RUN OUT BEFORE YOU GOT THE MONEY TO BUY MORE: NEVER TRUE
HOW OFTEN DO YOU HAVE SIX OR MORE DRINKS ON ONE OCCASION: NEVER
HOW OFTEN DO YOU ATTENT MEETINGS OF THE CLUB OR ORGANIZATION YOU BELONG TO?: MORE THAN 4 TIMES PER YEAR
DO YOU BELONG TO ANY CLUBS OR ORGANIZATIONS SUCH AS CHURCH GROUPS UNIONS, FRATERNAL OR ATHLETIC GROUPS, OR SCHOOL GROUPS?: YES
HOW OFTEN DO YOU GET TOGETHER WITH FRIENDS OR RELATIVES?: TWICE A WEEK
HOW OFTEN DO YOU ATTEND CHURCH OR RELIGIOUS SERVICES?: NEVER
HOW OFTEN DO YOU HAVE A DRINK CONTAINING ALCOHOL: 2-4 TIMES A MONTH
HOW OFTEN DO YOU ATTEND CHURCH OR RELIGIOUS SERVICES?: NEVER
HOW MANY DRINKS CONTAINING ALCOHOL DO YOU HAVE ON A TYPICAL DAY WHEN YOU ARE DRINKING: 1 OR 2
HOW HARD IS IT FOR YOU TO PAY FOR THE VERY BASICS LIKE FOOD, HOUSING, MEDICAL CARE, AND HEATING?: NOT HARD AT ALL
DO YOU BELONG TO ANY CLUBS OR ORGANIZATIONS SUCH AS CHURCH GROUPS UNIONS, FRATERNAL OR ATHLETIC GROUPS, OR SCHOOL GROUPS?: YES

## 2024-06-17 ASSESSMENT — LIFESTYLE VARIABLES
HOW OFTEN DO YOU HAVE SIX OR MORE DRINKS ON ONE OCCASION: NEVER
HOW MANY STANDARD DRINKS CONTAINING ALCOHOL DO YOU HAVE ON A TYPICAL DAY: 1 OR 2
HOW OFTEN DO YOU HAVE A DRINK CONTAINING ALCOHOL: 2-4 TIMES A MONTH
SKIP TO QUESTIONS 9-10: 1
AUDIT-C TOTAL SCORE: 2

## 2024-06-20 ENCOUNTER — OFFICE VISIT (OUTPATIENT)
Dept: MEDICAL GROUP | Facility: PHYSICIAN GROUP | Age: 74
End: 2024-06-20
Payer: MEDICARE

## 2024-06-20 VITALS
BODY MASS INDEX: 29.26 KG/M2 | HEART RATE: 65 BPM | WEIGHT: 228 LBS | DIASTOLIC BLOOD PRESSURE: 58 MMHG | SYSTOLIC BLOOD PRESSURE: 122 MMHG | OXYGEN SATURATION: 96 % | HEIGHT: 74 IN | TEMPERATURE: 97.7 F

## 2024-06-20 DIAGNOSIS — G56.03 BILATERAL CARPAL TUNNEL SYNDROME: ICD-10-CM

## 2024-06-20 DIAGNOSIS — E78.00 HYPERCHOLESTEROLEMIA: ICD-10-CM

## 2024-06-20 DIAGNOSIS — R35.1 NOCTURIA: ICD-10-CM

## 2024-06-20 DIAGNOSIS — M17.0 PRIMARY OSTEOARTHRITIS OF BOTH KNEES: ICD-10-CM

## 2024-06-20 PROCEDURE — 99214 OFFICE O/P EST MOD 30 MIN: CPT | Performed by: STUDENT IN AN ORGANIZED HEALTH CARE EDUCATION/TRAINING PROGRAM

## 2024-06-20 PROCEDURE — 3074F SYST BP LT 130 MM HG: CPT | Performed by: STUDENT IN AN ORGANIZED HEALTH CARE EDUCATION/TRAINING PROGRAM

## 2024-06-20 PROCEDURE — 3078F DIAST BP <80 MM HG: CPT | Performed by: STUDENT IN AN ORGANIZED HEALTH CARE EDUCATION/TRAINING PROGRAM

## 2024-06-20 RX ORDER — TAMSULOSIN HYDROCHLORIDE 0.4 MG/1
0.4 CAPSULE ORAL
Qty: 90 CAPSULE | Refills: 1 | Status: SHIPPED | OUTPATIENT
Start: 2024-06-20

## 2024-06-20 ASSESSMENT — ENCOUNTER SYMPTOMS
CHILLS: 0
HEADACHES: 0
FEVER: 0
SHORTNESS OF BREATH: 0
DIZZINESS: 0

## 2024-06-20 ASSESSMENT — FIBROSIS 4 INDEX: FIB4 SCORE: 2.37

## 2024-06-20 NOTE — PROGRESS NOTES
"Verbal consent was acquired by the patient to use Shippable ambient listening note generation during this visit.    Subjective:     HPI:   History of Present Illness  The patient presents to [facility]. The patient was previously under the care of [facility].    The patient reports a history of knee issues. Has OA affecting both knees, pain flares up intermittently. Typically responds well to physical therapy. Has previously had a corticosteroid injection on the left knee, and reports it was effective. Symptoms currently stable.    The patient reports ~1 year history of bilateral hand numbness/tingling, thought to be related to carpal tunnel syndrome.  He wears wrist braces at night, which provides relief most nights. He has observed that repetitive motions exacerbate his symptoms.  He does think that symptoms may not be due exclusively to carpal tunnel syndrome as stretching his neck often relieves symptoms.  He denies any hand weakness.  Denies any pain, numbness, tingling proximal to the wrists.    The patient has a history of hyper lipidemia.  Not on medication for this. The 10-year ASCVD risk score (Leora DK, et al., 2019) is: 23%     The patient was prescribed Flomax to treat nocturia.  Symptoms have become slowly more prominent over the past few years.  Symptoms have improved with Flomax 0.4 mg daily.  Reports waking up 1-2 times per night to urinate.  Has not had PSA checked.          Health Maintenance: Completed    ROS:  Review of Systems   Constitutional:  Negative for chills and fever.   Respiratory:  Negative for shortness of breath.    Cardiovascular:  Negative for chest pain.   Neurological:  Negative for dizziness and headaches.       Objective:     Exam:  /58 (BP Location: Left arm, Patient Position: Sitting, BP Cuff Size: Adult)   Pulse 65   Temp 36.5 °C (97.7 °F) (Temporal)   Ht 1.88 m (6' 2\")   Wt 103 kg (228 lb)   SpO2 96%   BMI 29.27 kg/m²  Body mass index is 29.27 " kg/m².    Physical Exam  Constitutional:       General: He is not in acute distress.  HENT:      Mouth/Throat:      Mouth: Mucous membranes are moist.   Eyes:      Extraocular Movements: Extraocular movements intact.   Cardiovascular:      Rate and Rhythm: Normal rate and regular rhythm.      Heart sounds: No murmur heard.     No friction rub. No gallop.   Pulmonary:      Effort: Pulmonary effort is normal.      Breath sounds: No wheezing, rhonchi or rales.   Musculoskeletal:      Cervical back: Neck supple.   Skin:     General: Skin is warm and dry.   Neurological:      Mental Status: He is alert.   Psychiatric:         Mood and Affect: Mood normal.         Results  Laboratory Studies 1/10/2024  Vitamin B12 normal  CBC with slightly reduced red blood cell count otherwise normal    CMP within normal limits    Imaging  Imaging of both knees shows osteoarthritis.    Assessment & Plan:     1. Hypercholesterolemia  CT-CARDIAC SCORING      2. Nocturia  tamsulosin (FLOMAX) 0.4 MG capsule      3. Bilateral carpal tunnel syndrome        4. Primary osteoarthritis of both knees            Assessment & Plan  1. Hyperlipidemia.  Chronic condition.  .  ASCVD risk score greater than 20%.  Discussed with patient that statin should be considered.  Coronary artery calcium scoring will be ordered.  Continue heart healthy diet and regular physical activity.    2. LUTS  Chronic, improving.  Continue Flomax 0.4 mg daily.  Will include PSA with next set of labs.    3.  Bilateral hand numbness.  Chronic, stable.  Presumably secondary to carpal tunnel syndrome.  Continue use of neutral wrist braces at night.  Patient declines C-spine MRI at this time but was advised to consider this if his symptoms start to worsen or he develops new symptoms.      4.  Bilateral knee pain.  Chronic, stable condition.  Continue activity modification, Tylenol/NSAIDs as needed, physical therapy as needed.  Consider referral to sports medicine or  ortho if symptoms worsen.          Follow-up  A follow-up appointment is scheduled for 6 months from now.            Please note that this dictation was created using voice recognition software. I have made every reasonable attempt to correct obvious errors, but I expect that there are errors of grammar and possibly content that I did not discover before finalizing the note.

## 2024-07-08 ENCOUNTER — HOME STUDY (OUTPATIENT)
Dept: SLEEP MEDICINE | Facility: MEDICAL CENTER | Age: 74
End: 2024-07-08
Attending: NURSE PRACTITIONER
Payer: MEDICARE

## 2024-07-08 DIAGNOSIS — R06.83 SNORING: ICD-10-CM

## 2024-07-08 PROCEDURE — G0400 HOME SLEEP TEST/TYPE 4 PORTA: HCPCS | Performed by: STUDENT IN AN ORGANIZED HEALTH CARE EDUCATION/TRAINING PROGRAM

## 2024-07-09 ENCOUNTER — HOSPITAL ENCOUNTER (OUTPATIENT)
Dept: RADIOLOGY | Facility: MEDICAL CENTER | Age: 74
End: 2024-07-09
Attending: STUDENT IN AN ORGANIZED HEALTH CARE EDUCATION/TRAINING PROGRAM
Payer: COMMERCIAL

## 2024-07-09 DIAGNOSIS — E78.00 HYPERCHOLESTEROLEMIA: ICD-10-CM

## 2024-07-09 PROCEDURE — 4410556 CT-CARDIAC SCORING (SELF PAY ONLY)

## 2024-07-10 DIAGNOSIS — R93.1 ELEVATED CORONARY ARTERY CALCIUM SCORE: ICD-10-CM

## 2024-07-10 DIAGNOSIS — E78.00 HYPERCHOLESTEROLEMIA: ICD-10-CM

## 2024-07-10 RX ORDER — ATORVASTATIN CALCIUM 40 MG/1
40 TABLET, FILM COATED ORAL NIGHTLY
Qty: 90 TABLET | Refills: 0 | Status: SHIPPED | OUTPATIENT
Start: 2024-07-10

## 2024-07-11 DIAGNOSIS — G47.33 OSA (OBSTRUCTIVE SLEEP APNEA): ICD-10-CM

## 2024-07-16 ASSESSMENT — ENCOUNTER SYMPTOMS: SLEEP DISTURBANCE: 0

## 2024-07-17 ENCOUNTER — OFFICE VISIT (OUTPATIENT)
Dept: SLEEP MEDICINE | Facility: MEDICAL CENTER | Age: 74
End: 2024-07-17
Attending: STUDENT IN AN ORGANIZED HEALTH CARE EDUCATION/TRAINING PROGRAM
Payer: MEDICARE

## 2024-07-17 VITALS
OXYGEN SATURATION: 95 % | SYSTOLIC BLOOD PRESSURE: 120 MMHG | DIASTOLIC BLOOD PRESSURE: 76 MMHG | WEIGHT: 225 LBS | HEART RATE: 71 BPM | BODY MASS INDEX: 27.98 KG/M2 | HEIGHT: 75 IN | RESPIRATION RATE: 16 BRPM

## 2024-07-17 DIAGNOSIS — G47.33 OSA (OBSTRUCTIVE SLEEP APNEA): Primary | ICD-10-CM

## 2024-07-17 PROCEDURE — 99204 OFFICE O/P NEW MOD 45 MIN: CPT | Performed by: STUDENT IN AN ORGANIZED HEALTH CARE EDUCATION/TRAINING PROGRAM

## 2024-07-17 PROCEDURE — 99211 OFF/OP EST MAY X REQ PHY/QHP: CPT | Performed by: STUDENT IN AN ORGANIZED HEALTH CARE EDUCATION/TRAINING PROGRAM

## 2024-07-17 PROCEDURE — 3074F SYST BP LT 130 MM HG: CPT | Performed by: STUDENT IN AN ORGANIZED HEALTH CARE EDUCATION/TRAINING PROGRAM

## 2024-07-17 PROCEDURE — 3078F DIAST BP <80 MM HG: CPT | Performed by: STUDENT IN AN ORGANIZED HEALTH CARE EDUCATION/TRAINING PROGRAM

## 2024-07-17 ASSESSMENT — FIBROSIS 4 INDEX: FIB4 SCORE: 2.37

## 2024-10-06 DIAGNOSIS — R93.1 ELEVATED CORONARY ARTERY CALCIUM SCORE: ICD-10-CM

## 2024-10-06 DIAGNOSIS — E78.00 HYPERCHOLESTEROLEMIA: ICD-10-CM

## 2024-10-06 DIAGNOSIS — R35.1 NOCTURIA: ICD-10-CM

## 2024-10-08 RX ORDER — TAMSULOSIN HYDROCHLORIDE 0.4 MG/1
0.4 CAPSULE ORAL
Qty: 90 CAPSULE | Refills: 3 | Status: SHIPPED | OUTPATIENT
Start: 2024-10-08

## 2024-10-08 RX ORDER — ATORVASTATIN CALCIUM 40 MG/1
40 TABLET, FILM COATED ORAL NIGHTLY
Qty: 90 TABLET | Refills: 3 | Status: SHIPPED | OUTPATIENT
Start: 2024-10-08

## 2024-11-19 ENCOUNTER — OFFICE VISIT (OUTPATIENT)
Dept: SLEEP MEDICINE | Facility: MEDICAL CENTER | Age: 74
End: 2024-11-19
Attending: STUDENT IN AN ORGANIZED HEALTH CARE EDUCATION/TRAINING PROGRAM
Payer: MEDICARE

## 2024-11-19 VITALS
SYSTOLIC BLOOD PRESSURE: 122 MMHG | DIASTOLIC BLOOD PRESSURE: 72 MMHG | OXYGEN SATURATION: 96 % | HEIGHT: 75 IN | HEART RATE: 78 BPM | BODY MASS INDEX: 27.98 KG/M2 | RESPIRATION RATE: 16 BRPM | WEIGHT: 225 LBS

## 2024-11-19 DIAGNOSIS — G47.33 OSA (OBSTRUCTIVE SLEEP APNEA): ICD-10-CM

## 2024-11-19 PROCEDURE — 99214 OFFICE O/P EST MOD 30 MIN: CPT | Performed by: STUDENT IN AN ORGANIZED HEALTH CARE EDUCATION/TRAINING PROGRAM

## 2024-11-19 PROCEDURE — 99211 OFF/OP EST MAY X REQ PHY/QHP: CPT | Performed by: STUDENT IN AN ORGANIZED HEALTH CARE EDUCATION/TRAINING PROGRAM

## 2024-11-19 PROCEDURE — 3078F DIAST BP <80 MM HG: CPT | Performed by: STUDENT IN AN ORGANIZED HEALTH CARE EDUCATION/TRAINING PROGRAM

## 2024-11-19 PROCEDURE — 3074F SYST BP LT 130 MM HG: CPT | Performed by: STUDENT IN AN ORGANIZED HEALTH CARE EDUCATION/TRAINING PROGRAM

## 2024-11-19 ASSESSMENT — FIBROSIS 4 INDEX: FIB4 SCORE: 2.37

## 2024-11-19 NOTE — PROGRESS NOTES
[facility] Follow-up Visit    CC: Follow-up discuss management of obstructive sleep apnea after receiving CPAP machine      HPI:  Mariusz Jackson is a 74 y.o.male  with BPH, hypercholesterolemia, and severe obstructive sleep apnea on CPAP.  Presents today to follow-up regarding management of obstructive sleep apnea.    Since last visit he has received his PAP machine.  He is using it daily but having difficulty staying asleep on it.  He feels he wakes up more with his CPAP machine.  He has noticed that he is sleeping on his back with use of CPAP.  With moving at night he can have difficulty with getting a good seal.    He has been told by his wife that the air through the mask can disturb her sleep.  He does occasionally have issues with itching near his nose with wearing the mask.  In addition he has had 3 episodes of the last 100 days of a runny nose in the morning following PAP usage.    DME provider: [facility]   Device: Airsense 10   Mask: nasal cushion  Aerophagia: No   Snoring: slight   Dry mouth: sometimes   Leak: can happen in the night  Skin irritation: No   Chin strap: No     Sleep History  7/8/2024 HST showed severe obstructive sleep apnea with an overall 4% AHI of 38 events an hour, RDI of 54 events an hour, time at or below 88% saturation of 6.6 minutes.    Patient Active Problem List    Diagnosis Date Noted    Nocturia 06/20/2024    Snoring 05/02/2024    Chronic pain of both knees 05/24/2023    Bilateral carpal tunnel syndrome 05/24/2023    Other male erectile dysfunction 04/19/2022    Hypercholesterolemia 04/19/2022    History of laminectomy 10/19/2021    Chronic lumbar pain 06/19/2019       Past Medical History:   Diagnosis Date    Apnea, sleep     Daytime sleepiness     Insomnia         No past surgical history on file.    No family history on file.    Social History     Socioeconomic History    Marital status:      Spouse name: Not on file    Number of children: Not on file    Years  of education: Not on file    Highest education level: Doctorate   Occupational History    Not on file   Tobacco Use    Smoking status: Never    Smokeless tobacco: Never   Vaping Use    Vaping status: Never Used   Substance and Sexual Activity    Alcohol use: Yes     Comment: occ    Drug use: Never    Sexual activity: Yes     Partners: Female     Birth control/protection: None     Comment: 1 partner    Other Topics Concern    Not on file   Social History Narrative    Not on file     Social Drivers of Health     Financial Resource Strain: Low Risk  (6/17/2024)    Overall Financial Resource Strain (CARDIA)     Difficulty of Paying Living Expenses: Not hard at all   Food Insecurity: No Food Insecurity (6/17/2024)    Hunger Vital Sign     Worried About Running Out of Food in the Last Year: Never true     Ran Out of Food in the Last Year: Never true   Transportation Needs: No Transportation Needs (6/17/2024)    PRAPARE - Transportation     Lack of Transportation (Medical): No     Lack of Transportation (Non-Medical): No   Physical Activity: Insufficiently Active (6/17/2024)    Exercise Vital Sign     Days of Exercise per Week: 4 days     Minutes of Exercise per Session: 30 min   Stress: No Stress Concern Present (6/17/2024)    [employer] [place] [employer] - Occupational Stress Questionnaire     Feeling of Stress : Only a little   Social Connections: Moderately Integrated (6/17/2024)    Social Connection and Isolation Panel [NHANES]     Frequency of Communication with Friends and Family: Three times a week     Frequency of Social Gatherings with Friends and Family: Twice a week     Attends Yarsani Services: Never     Active Member of Clubs or Organizations: Yes     Attends Club or Organization Meetings: More than 4 times per year     Marital Status:    Intimate Partner Violence: Unknown (11/10/2022)    Received from [facility], [facility]    Intimate Partner Violence     Fear of  "Current or Ex-Partner: Not on file     Emotionally Abused: Not on file     Physically Abused: Not on file     Sexually Abused: Not on file     Questions Apply to Other Individual: Not on file   Housing Stability: Unknown (6/17/2024)    Housing Stability Vital Sign     Unable to Pay for Housing in the Last Year: No     Number of Places Lived in the Last Year: Not on file     Unstable Housing in the Last Year: No       Current Outpatient Medications   Medication Sig Dispense Refill    tamsulosin (FLOMAX) 0.4 MG capsule Take 1 Capsule by mouth 1/2 hour after breakfast. 90 Capsule 3    atorvastatin (LIPITOR) 40 MG Tab Take 1 Tablet by mouth every evening. 90 Tablet 3    Glucosamine HCl 1500 MG Tab       Probiotic Product (PROBIOTIC PO) Take  by mouth.      Turmeric (QC TUMERIC COMPLEX PO) Take  by mouth.      multivitamin Tab Take 1 Tablet by mouth every day.       No current facility-administered medications for this visit.        ALLERGIES: Patient has no known allergies.    ROS  Constitutional: Denies fevers, Denies weight changes  Ears/Nose/Throat/Mouth: Denies nasal congestion or sore throat   Cardiovascular: Denies chest pain  Respiratory: Denies shortness of breath, Denies cough  Gastrointestinal/Hepatic: Denies nausea, vomiting  Sleep: see HPI      PHYSICAL EXAM  /72 (BP Location: Left arm, Patient Position: Sitting, BP Cuff Size: Adult)   Pulse 78   Resp 16   Ht 1.905 m (6' 3\")   Wt 102 kg (225 lb)   SpO2 96%   BMI 28.12 kg/m²   Appearance: Well-nourished, well-developed, no acute distress  Eyes:  No scleral icterus , EOMI  Musculoskeletal:  Grossly normal; gait and station normal; digits and nails normal  Skin:  No rashes, petechiae, cyanosis  Neurologic: without focal signs; oriented to person, time, place, and purpose; judgement intact      Medical Decision Making   Assessment and Plan  Mariusz Jackson is a 74 y.o.male  with BPH, hypercholesterolemia, and severe obstructive sleep apnea on CPAP.  " Presents today to follow-up regarding management of obstructive sleep apnea.    The medical record was reviewed.    Obstructive sleep apnea  Compliance data reviewed showing 87% usage > 4hours in last 30  days. Average AHI 3.4 events/hour. Pt continues to use and benefit from machine.      Current Settings 5-15    Discussed his mask.  Given his troubles with noise at night and it waking him up suspect he may be having a leak that is disturbing his sleep.  Advised he may consider trying a top connecting nasal mask.  He is open to doing so.  Also discussed other mask types.  Also discussed mouth tape and chinstrap.  Reviewed pressure differences.  He would like to stay at the current pressure and see if the mask adjustment may help.  Sample given of ResMed DAO I at today's visit.      PLAN:   -Order placed for mask and supplies   -Advised to reach out via MyChart with questions     Has been advised to continue the current CPAP, clean equipment frequently, and get new mask and supplies as allowed by insurance and DME. Recommend an earlier appointment, if significant treatment barriers develop.    Patients with JOSIAS are at increased risk of cardiovascular disease including coronary artery disease, systemic arterial hypertension, pulmonary arterial hypertension, cardiac arrythmias, and stroke. The patient was advised to avoid driving a motor vehicle when drowsy.      Return in about 4 months (around 3/19/2025).    Total time care for the patient this date was 31 minutes. Time spent includes chart review, lab review, discussion with myself. This time was spent separate from device analysis /programming on this date.      Please note portions of this record was created using voice recognition software. I have made every reasonable attempt to correct obvious errors, but I expect that there are errors of grammar and possibly content I did not discover before finalizing the note.

## 2025-01-09 ENCOUNTER — OFFICE VISIT (OUTPATIENT)
Dept: MEDICAL GROUP | Facility: PHYSICIAN GROUP | Age: 75
End: 2025-01-09
Payer: MEDICARE

## 2025-01-09 VITALS
WEIGHT: 236.4 LBS | TEMPERATURE: 98.3 F | HEART RATE: 67 BPM | HEIGHT: 75 IN | BODY MASS INDEX: 29.39 KG/M2 | DIASTOLIC BLOOD PRESSURE: 60 MMHG | SYSTOLIC BLOOD PRESSURE: 110 MMHG | OXYGEN SATURATION: 96 %

## 2025-01-09 DIAGNOSIS — G89.29 CHRONIC PAIN OF BOTH KNEES: ICD-10-CM

## 2025-01-09 DIAGNOSIS — M25.561 CHRONIC PAIN OF BOTH KNEES: ICD-10-CM

## 2025-01-09 DIAGNOSIS — G47.33 OSA (OBSTRUCTIVE SLEEP APNEA): ICD-10-CM

## 2025-01-09 DIAGNOSIS — M25.562 CHRONIC PAIN OF BOTH KNEES: ICD-10-CM

## 2025-01-09 DIAGNOSIS — Z13.1 SCREENING FOR DIABETES MELLITUS: ICD-10-CM

## 2025-01-09 DIAGNOSIS — E78.00 HYPERCHOLESTEROLEMIA: ICD-10-CM

## 2025-01-09 PROCEDURE — 99214 OFFICE O/P EST MOD 30 MIN: CPT | Performed by: STUDENT IN AN ORGANIZED HEALTH CARE EDUCATION/TRAINING PROGRAM

## 2025-01-09 PROCEDURE — 3074F SYST BP LT 130 MM HG: CPT | Performed by: STUDENT IN AN ORGANIZED HEALTH CARE EDUCATION/TRAINING PROGRAM

## 2025-01-09 PROCEDURE — 3078F DIAST BP <80 MM HG: CPT | Performed by: STUDENT IN AN ORGANIZED HEALTH CARE EDUCATION/TRAINING PROGRAM

## 2025-01-09 ASSESSMENT — ENCOUNTER SYMPTOMS
CHILLS: 0
SHORTNESS OF BREATH: 0
FEVER: 0
HEADACHES: 0
DIZZINESS: 0

## 2025-01-09 ASSESSMENT — PATIENT HEALTH QUESTIONNAIRE - PHQ9: CLINICAL INTERPRETATION OF PHQ2 SCORE: 0

## 2025-01-09 ASSESSMENT — FIBROSIS 4 INDEX: FIB4 SCORE: 2.37

## 2025-01-10 NOTE — PROGRESS NOTES
Verbal consent was acquired by the patient to use [employer] ambient listening note generation during this visit.    Subjective:     HPI:   History of Present Illness  The patient presents for follow up.    Knee Pain  He has engaged in physical therapy for knee pain, which did help quite a bit. However, he experiences intermittent exacerbations of pain in his knees and sometimes his hips for a few days about once a month, always after exercise. Only affects the lower extremities and improved with ibuprofen. He maintains a regular exercise routine, alternating activities daily, and aims to walk a mile each day.     Hyperlipidemia  Chronic condition. Has started atorvastatin 40 mg daily, reports no noticeable adverse effects from the medication.     Sleep Apnea  He has recently started using a CPAP machine for sleep apnea and is following with sleep medicine.            Past medical, surgical, family, and social history were reviewed and updated.     Medications:    Current Outpatient Medications:     tamsulosin (FLOMAX) 0.4 MG capsule, Take 1 Capsule by mouth 1/2 hour after breakfast., Disp: 90 Capsule, Rfl: 3    atorvastatin (LIPITOR) 40 MG Tab, Take 1 Tablet by mouth every evening., Disp: 90 Tablet, Rfl: 3    Glucosamine HCl 1500 MG Tab, , Disp: , Rfl:     Probiotic Product (PROBIOTIC PO), Take  by mouth., Disp: , Rfl:     Turmeric (QC TUMERIC COMPLEX PO), Take  by mouth., Disp: , Rfl:     multivitamin Tab, Take 1 Tablet by mouth every day., Disp: , Rfl:     Allergies:  Patient has no known allergies.      Health Maintenance: Completed    ROS:  Review of Systems   Constitutional:  Negative for chills and fever.   Respiratory:  Negative for shortness of breath.    Cardiovascular:  Negative for chest pain.   Neurological:  Negative for dizziness and headaches.       Objective:     Exam:  /60 (BP Location: Left arm, Patient Position: Sitting, BP Cuff Size: Adult)   Pulse 67   Temp 36.8 °C (98.3 °F) (Temporal)  "  Ht 1.905 m (6' 3\")   Wt 107 kg (236 lb 6.4 oz)   SpO2 96%   BMI 29.55 kg/m²  Body mass index is 29.55 kg/m².    Physical Exam  Constitutional:       General: He is not in acute distress.  HENT:      Mouth/Throat:      Mouth: Mucous membranes are moist.   Eyes:      Extraocular Movements: Extraocular movements intact.   Cardiovascular:      Rate and Rhythm: Normal rate and regular rhythm.      Heart sounds: No murmur heard.     No friction rub. No gallop.   Pulmonary:      Effort: Pulmonary effort is normal.      Breath sounds: No wheezing, rhonchi or rales.   Musculoskeletal:      Cervical back: Neck supple.   Skin:     General: Skin is warm and dry.   Neurological:      Mental Status: He is alert.   Psychiatric:         Mood and Affect: Mood normal.           Results  - Imaging:  CT coronary artery calcium score: 2668.9       Assessment & Plan:     1. Chronic pain of both knees        2. Hypercholesterolemia  Comp Metabolic Panel    Lipid Profile      3. JOSIAS (obstructive sleep apnea)        4. Screening for diabetes mellitus  HEMOGLOBIN A1C          Assessment & Plan  1. Knee pain - Chronic in nature, has improved somewhat with physical therapy but still experiencing intermittent knee soreness after activity.   - Continue physical activity but avoid aggravating activity  - Continue NSAIDs as needed    2. Hyperlipidemia - Chronic, with elevated CAC score   - Continue atorvastatin  - Start daily aspirin 81 mg  - Recheck labs    3. Sleep apnea - Chronic, stable, managed by sleep medicine      Follow-up  - Follow up in 3 months            Please note that this dictation was created using voice recognition software. I have made every reasonable attempt to correct obvious errors, but I expect that there are errors of grammar and possibly content that I did not discover before finalizing the note.        "

## 2025-01-29 ENCOUNTER — OFFICE VISIT (OUTPATIENT)
Dept: URGENT CARE | Facility: CLINIC | Age: 75
End: 2025-01-29
Payer: MEDICARE

## 2025-01-29 VITALS
DIASTOLIC BLOOD PRESSURE: 72 MMHG | TEMPERATURE: 97.2 F | BODY MASS INDEX: 28.35 KG/M2 | WEIGHT: 228 LBS | OXYGEN SATURATION: 96 % | HEART RATE: 71 BPM | HEIGHT: 75 IN | RESPIRATION RATE: 18 BRPM | SYSTOLIC BLOOD PRESSURE: 118 MMHG

## 2025-01-29 DIAGNOSIS — Z01.10 NORMAL EAR EXAM: ICD-10-CM

## 2025-01-29 PROCEDURE — 99212 OFFICE O/P EST SF 10 MIN: CPT | Performed by: PHYSICIAN ASSISTANT

## 2025-01-29 ASSESSMENT — FIBROSIS 4 INDEX: FIB4 SCORE: 2.4

## 2025-01-29 ASSESSMENT — ENCOUNTER SYMPTOMS
CHILLS: 0
FEVER: 0
COUGH: 0
SORE THROAT: 0

## 2025-01-29 NOTE — PROGRESS NOTES
"Subjective     Mariusz Jackson is a 75 y.o. male who presents with Cerumen Impaction (Possible ear wax impaction )    PMH:  has a past medical history of Apnea, sleep, Daytime sleepiness, and Insomnia.  MEDS:   Current Outpatient Medications:     tamsulosin (FLOMAX) 0.4 MG capsule, Take 1 Capsule by mouth 1/2 hour after breakfast., Disp: 90 Capsule, Rfl: 3    atorvastatin (LIPITOR) 40 MG Tab, Take 1 Tablet by mouth every evening., Disp: 90 Tablet, Rfl: 3    Glucosamine HCl 1500 MG Tab, , Disp: , Rfl:     Probiotic Product (PROBIOTIC PO), Take  by mouth., Disp: , Rfl:     Turmeric (QC TUMERIC COMPLEX PO), Take  by mouth., Disp: , Rfl:     multivitamin Tab, Take 1 Tablet by mouth every day., Disp: , Rfl:   ALLERGIES: No Known Allergies  SURGHX: History reviewed. No pertinent surgical history.  SOCHX:  reports that he has never smoked. He has never used smokeless tobacco. He reports current alcohol use. He reports that he does not use drugs.  FH: Reviewed with patient, not pertinent to this visit.           Patient presents with  Possible ear wax impaction, no other complaints.         Cerumen Impaction  Pertinent negatives include no chills, congestion, coughing, fever or sore throat.       Review of Systems   Constitutional:  Negative for chills and fever.   HENT:  Negative for congestion, ear discharge, ear pain and sore throat.    Respiratory:  Negative for cough.    All other systems reviewed and are negative.             Objective     /72 (BP Location: Left arm, Patient Position: Sitting, BP Cuff Size: Adult)   Pulse 71   Temp 36.2 °C (97.2 °F) (Temporal)   Resp 18   Ht 1.905 m (6' 3\")   Wt 103 kg (228 lb)   SpO2 96%   BMI 28.50 kg/m²      Physical Exam  Vitals and nursing note reviewed.   Constitutional:       General: He is not in acute distress.     Appearance: Normal appearance. He is well-developed. He is not ill-appearing or toxic-appearing.   HENT:      Head: Normocephalic and atraumatic.     "  Right Ear: Tympanic membrane and ear canal normal.      Left Ear: Tympanic membrane and ear canal normal.      Nose: Nose normal.      Mouth/Throat:      Lips: Pink.      Mouth: Mucous membranes are moist.      Pharynx: Oropharynx is clear. Uvula midline.   Eyes:      Extraocular Movements: Extraocular movements intact.      Conjunctiva/sclera: Conjunctivae normal.      Pupils: Pupils are equal, round, and reactive to light.   Cardiovascular:      Rate and Rhythm: Normal rate and regular rhythm.      Pulses: Normal pulses.      Heart sounds: Normal heart sounds.   Pulmonary:      Effort: Pulmonary effort is normal.      Breath sounds: Normal breath sounds.   Abdominal:      General: Bowel sounds are normal.      Palpations: Abdomen is soft.   Musculoskeletal:         General: Normal range of motion.      Cervical back: Normal range of motion and neck supple.   Skin:     General: Skin is warm and dry.      Capillary Refill: Capillary refill takes less than 2 seconds.   Neurological:      General: No focal deficit present.      Mental Status: He is alert and oriented to person, place, and time.      Cranial Nerves: No cranial nerve deficit.      Motor: Motor function is intact.      Coordination: Coordination is intact.      Gait: Gait normal.   Psychiatric:         Mood and Affect: Mood normal.                             Assessment & Plan        Assessment & Plan  Normal ear exam               Pt here with wife for possible cerumen impaction. Bilateral ear exam is normal.      Differential diagnosis, supportive care, and indications for immediate follow-up discussed with patient.  Instructed to return to clinic or nearest emergency department for any change in condition, further concerns, or worsening of symptoms.    I personally reviewed prior external notes and test results pertinent to today's visit.  I have independently reviewed and interpreted all diagnostics ordered during this urgent care visit.    PT  should follow up with PCP in 1-2 days for re-evaluation if symptoms have not improved.      Discussed red flags and reasons to return to UC or ED.      Pt and/or family verbalized understanding of diagnosis and follow up instructions and was offered informational handout on diagnosis.  PT discharged.     Please note that this dictation was created using voice recognition software. I have made every reasonable attempt to correct obvious errors, but I expect that there may be errors of grammar and possibly content that I did not discover before finalizing the note.

## 2025-03-19 ENCOUNTER — OFFICE VISIT (OUTPATIENT)
Dept: SLEEP MEDICINE | Facility: MEDICAL CENTER | Age: 75
End: 2025-03-19
Attending: STUDENT IN AN ORGANIZED HEALTH CARE EDUCATION/TRAINING PROGRAM
Payer: MEDICARE

## 2025-03-19 VITALS
DIASTOLIC BLOOD PRESSURE: 60 MMHG | BODY MASS INDEX: 28.47 KG/M2 | RESPIRATION RATE: 20 BRPM | WEIGHT: 229 LBS | HEIGHT: 75 IN | HEART RATE: 68 BPM | SYSTOLIC BLOOD PRESSURE: 130 MMHG | OXYGEN SATURATION: 97 %

## 2025-03-19 DIAGNOSIS — G47.33 OSA (OBSTRUCTIVE SLEEP APNEA): ICD-10-CM

## 2025-03-19 DIAGNOSIS — R09.81 NASAL CONGESTION: ICD-10-CM

## 2025-03-19 PROCEDURE — 99212 OFFICE O/P EST SF 10 MIN: CPT | Performed by: STUDENT IN AN ORGANIZED HEALTH CARE EDUCATION/TRAINING PROGRAM

## 2025-03-19 PROCEDURE — 99214 OFFICE O/P EST MOD 30 MIN: CPT | Performed by: STUDENT IN AN ORGANIZED HEALTH CARE EDUCATION/TRAINING PROGRAM

## 2025-03-19 PROCEDURE — 3078F DIAST BP <80 MM HG: CPT | Performed by: STUDENT IN AN ORGANIZED HEALTH CARE EDUCATION/TRAINING PROGRAM

## 2025-03-19 PROCEDURE — 3075F SYST BP GE 130 - 139MM HG: CPT | Performed by: STUDENT IN AN ORGANIZED HEALTH CARE EDUCATION/TRAINING PROGRAM

## 2025-03-19 ASSESSMENT — FIBROSIS 4 INDEX: FIB4 SCORE: 2.4

## 2025-03-19 NOTE — PROGRESS NOTES
[facility] Follow-up Visit    CC: Follow-up regarding management of obstructive sleep apnea      HPI:  Mairusz Jackson is a 75 y.o.male  with hypercholesterolemia, BPH, and severe obstructive sleep apnea on CPAP.  Presents today to follow-up regarding management obstructive sleep apnea.    He continues to use his PAP machine.  He is finding that after about 2 to 4 hours of PAP usage she has significant nasal congestion that leads to him taking the mask off at night.  Since his last visit he had 1 other rare episode of significant nasal drainage that resolved on its own.  He is using nasal saline spray at night to help with dry nose.  He does have fluticasone nasal spray (Flonase) at home but she does not use regularly.    He has not noticed any difference in quality of sleep with use of the PAP machine.  He feels at times with nasal congestion at night that this can lead to the machine having higher pressures which can wake him up.  After he is awake and he will often take the machine off and does have difficulty getting back to sleep.  He has not tried a hybrid style fullface mask.    DME provider: [employer]   Device: Airsense 10   Mask: nasal cushion top connecting   Aerophagia: No   Snoring: slight   Dry mouth: sometimes   Leak: can happen in the night  Skin irritation: No   Chin strap: No      Sleep History  7/8/2024 HST showed severe obstructive sleep apnea with an overall 4% AHI of 38 events an hour, RDI of 54 events an hour, time at or below 88% saturation of 6.6 minutes.       Patient Active Problem List    Diagnosis Date Noted    JOSIAS (obstructive sleep apnea) 01/09/2025    Nocturia 06/20/2024    Snoring 05/02/2024    Chronic pain of both knees 05/24/2023    Bilateral carpal tunnel syndrome 05/24/2023    Other male erectile dysfunction 04/19/2022    Hypercholesterolemia 04/19/2022    History of laminectomy 10/19/2021    Chronic lumbar pain 06/19/2019       Past Medical History:   Diagnosis Date     Apnea, sleep     Daytime sleepiness     Insomnia         No past surgical history on file.    No family history on file.    Social History     Socioeconomic History    Marital status:      Spouse name: Not on file    Number of children: Not on file    Years of education: Not on file    Highest education level: Doctorate   Occupational History    Not on file   Tobacco Use    Smoking status: Never    Smokeless tobacco: Never   Vaping Use    Vaping status: Never Used   Substance and Sexual Activity    Alcohol use: Yes     Comment: occ    Drug use: Never    Sexual activity: Yes     Partners: Female     Birth control/protection: None     Comment: 1 partner    Other Topics Concern    Not on file   Social History Narrative    Not on file     Social Drivers of Health     Financial Resource Strain: Low Risk  (6/17/2024)    Overall Financial Resource Strain (CARDIA)     Difficulty of Paying Living Expenses: Not hard at all   Food Insecurity: No Food Insecurity (6/17/2024)    Hunger Vital Sign     Worried About Running Out of Food in the Last Year: Never true     Ran Out of Food in the Last Year: Never true   Transportation Needs: No Transportation Needs (6/17/2024)    PRAPARE - Transportation     Lack of Transportation (Medical): No     Lack of Transportation (Non-Medical): No   Physical Activity: Insufficiently Active (6/17/2024)    Exercise Vital Sign     Days of Exercise per Week: 4 days     Minutes of Exercise per Session: 30 min   Stress: No Stress Concern Present (6/17/2024)    [employer] - Occupational Stress Questionnaire     Feeling of Stress : Only a little   Social Connections: Moderately Integrated (6/17/2024)    Social Connection and Isolation Panel [NHANES]     Frequency of Communication with Friends and Family: Three times a week     Frequency of Social Gatherings with Friends and Family: Twice a week     Attends Shinto Services: Never     Active Member of Clubs or  "Organizations: Yes     Attends Club or Organization Meetings: More than 4 times per year     Marital Status:    Intimate Partner Violence: Unknown (11/10/2022)    Received from [employer]    Intimate Partner Violence     Fear of Current or Ex-Partner: Not on file     Emotionally Abused: Not on file     Physically Abused: Not on file     Sexually Abused: Not on file     Questions Apply to Other Individual: Not on file   Housing Stability: Unknown (6/17/2024)    Housing Stability Vital Sign     Unable to Pay for Housing in the Last Year: No     Number of Places Lived in the Last Year: Not on file     Unstable Housing in the Last Year: No       Current Outpatient Medications   Medication Sig Dispense Refill    tamsulosin (FLOMAX) 0.4 MG capsule Take 1 Capsule by mouth 1/2 hour after breakfast. 90 Capsule 3    atorvastatin (LIPITOR) 40 MG Tab Take 1 Tablet by mouth every evening. 90 Tablet 3    Glucosamine HCl 1500 MG Tab       Probiotic Product (PROBIOTIC PO) Take  by mouth.      Turmeric (QC TUMERIC COMPLEX PO) Take  by mouth.      multivitamin Tab Take 1 Tablet by mouth every day.       No current facility-administered medications for this visit.        ALLERGIES: Patient has no known allergies.    ROS  Constitutional: Denies fevers, Denies weight changes  Ears/Nose/Throat/Mouth: Denies nasal congestion or sore throat   Cardiovascular: Denies chest pain  Respiratory: Denies shortness of breath, Denies cough  Gastrointestinal/Hepatic: Denies nausea, vomiting  Sleep: see HPI      PHYSICAL EXAM  /60 (BP Location: Left arm, Patient Position: Sitting, BP Cuff Size: Adult)   Pulse 68   Resp 20   Ht 1.905 m (6' 3\")   Wt 104 kg (229 lb)   SpO2 97%   BMI 28.62 kg/m²   Appearance: Well-nourished, well-developed, no acute distress  Eyes:  No scleral icterus , EOMI  Musculoskeletal:  Grossly normal; gait and station normal; digits and nails normal  Skin:  No rashes, petechiae, " cyanosis  Neurologic: without focal signs; oriented to person, time, place, and purpose; judgement intact      Medical Decision Making   Assessment and Plan  Mariusz Jackson is a 75 y.o.male  with hypercholesterolemia, BPH, and severe obstructive sleep apnea on CPAP.  Presents today to follow-up regarding management obstructive sleep apnea.    The medical record was reviewed.    Obstructive sleep apnea  Compliance data reviewed showing 93% usage > 4hours in last 30  days. Average AHI 7.5 events/hour. Pt continues to use and benefit from machine.      Current Settings auto CPAP 5-15    Reviewed data with patient.  Advised that the AHI is slightly elevated but this may be due to laying awake on the machine.        Nasal congestion   Given his complaints regarding nasal congestion and rare episodes of runny nose recommended he may try fluticasone nasal spray 1 spray in each nostril in the evening.  Advised to use for least 3 to 4 weeks to get maximum benefit from Flonase to see if this makes a difference.  He may also consider contacting his DME company and trying a hybrid style top connecting fullface mask.    At this time will not adjust pressures.  Advised for him to reach out in [employer] if he would like his pressures adjusted after trying above listed interventions.    PLAN:   -Order placed for mask and supplies   -Start fluticasone nasal spray 1 spray in each nostril daily for at least 3 weeks, may stop if significant bloody noses develop  -Advised to reach out via [employer] with questions     Has been advised to continue the current CPAP, clean equipment frequently, and get new mask and supplies as allowed by insurance and DME. Recommend an earlier appointment, if significant treatment barriers develop.    Patients with JOSIAS are at increased risk of cardiovascular disease including coronary artery disease, systemic arterial hypertension, pulmonary arterial hypertension, cardiac arrythmias, and stroke.    Return in  about 4 months (around 7/19/2025).      Please note portions of this record was created using voice recognition software. I have made every reasonable attempt to correct obvious errors, but I expect that there are errors of grammar and possibly content I did not discover before finalizing the note.

## 2025-03-20 ENCOUNTER — RESULTS FOLLOW-UP (OUTPATIENT)
Dept: MEDICAL GROUP | Facility: PHYSICIAN GROUP | Age: 75
End: 2025-03-20

## 2025-03-20 ENCOUNTER — HOSPITAL ENCOUNTER (OUTPATIENT)
Dept: LAB | Facility: MEDICAL CENTER | Age: 75
End: 2025-03-20
Attending: STUDENT IN AN ORGANIZED HEALTH CARE EDUCATION/TRAINING PROGRAM
Payer: MEDICARE

## 2025-03-20 DIAGNOSIS — E78.00 HYPERCHOLESTEROLEMIA: ICD-10-CM

## 2025-03-20 DIAGNOSIS — Z13.1 SCREENING FOR DIABETES MELLITUS: ICD-10-CM

## 2025-03-20 LAB
ALBUMIN SERPL BCP-MCNC: 4 G/DL (ref 3.2–4.9)
ALBUMIN/GLOB SERPL: 1.3 G/DL
ALP SERPL-CCNC: 66 U/L (ref 30–99)
ALT SERPL-CCNC: 15 U/L (ref 2–50)
ANION GAP SERPL CALC-SCNC: 11 MMOL/L (ref 7–16)
AST SERPL-CCNC: 24 U/L (ref 12–45)
BILIRUB SERPL-MCNC: 0.6 MG/DL (ref 0.1–1.5)
BUN SERPL-MCNC: 21 MG/DL (ref 8–22)
CALCIUM ALBUM COR SERPL-MCNC: 9 MG/DL (ref 8.5–10.5)
CALCIUM SERPL-MCNC: 9 MG/DL (ref 8.5–10.5)
CHLORIDE SERPL-SCNC: 104 MMOL/L (ref 96–112)
CHOLEST SERPL-MCNC: 141 MG/DL (ref 100–199)
CO2 SERPL-SCNC: 23 MMOL/L (ref 20–33)
CREAT SERPL-MCNC: 0.91 MG/DL (ref 0.5–1.4)
EST. AVERAGE GLUCOSE BLD GHB EST-MCNC: 114 MG/DL
FASTING STATUS PATIENT QL REPORTED: NORMAL
GFR SERPLBLD CREATININE-BSD FMLA CKD-EPI: 87 ML/MIN/1.73 M 2
GLOBULIN SER CALC-MCNC: 3 G/DL (ref 1.9–3.5)
GLUCOSE SERPL-MCNC: 100 MG/DL (ref 65–99)
HBA1C MFR BLD: 5.6 % (ref 4–5.6)
HDLC SERPL-MCNC: 63 MG/DL
LDLC SERPL CALC-MCNC: 66 MG/DL
POTASSIUM SERPL-SCNC: 4.2 MMOL/L (ref 3.6–5.5)
PROT SERPL-MCNC: 7 G/DL (ref 6–8.2)
SODIUM SERPL-SCNC: 138 MMOL/L (ref 135–145)
TRIGL SERPL-MCNC: 61 MG/DL (ref 0–149)

## 2025-03-20 PROCEDURE — 36415 COLL VENOUS BLD VENIPUNCTURE: CPT | Mod: GA

## 2025-03-20 PROCEDURE — 80053 COMPREHEN METABOLIC PANEL: CPT

## 2025-03-20 PROCEDURE — 80061 LIPID PANEL: CPT

## 2025-03-20 PROCEDURE — 83036 HEMOGLOBIN GLYCOSYLATED A1C: CPT | Mod: GA

## 2025-04-09 ENCOUNTER — OFFICE VISIT (OUTPATIENT)
Dept: MEDICAL GROUP | Facility: PHYSICIAN GROUP | Age: 75
End: 2025-04-09
Payer: MEDICARE

## 2025-04-09 VITALS
DIASTOLIC BLOOD PRESSURE: 60 MMHG | TEMPERATURE: 97.7 F | OXYGEN SATURATION: 98 % | BODY MASS INDEX: 28.6 KG/M2 | HEART RATE: 67 BPM | WEIGHT: 230 LBS | SYSTOLIC BLOOD PRESSURE: 110 MMHG | HEIGHT: 75 IN

## 2025-04-09 DIAGNOSIS — G47.33 OSA ON CPAP: ICD-10-CM

## 2025-04-09 DIAGNOSIS — E78.00 HYPERCHOLESTEROLEMIA: ICD-10-CM

## 2025-04-09 DIAGNOSIS — G47.9 SLEEP DISTURBANCE: ICD-10-CM

## 2025-04-09 PROBLEM — R06.83 SNORING: Status: RESOLVED | Noted: 2024-05-02 | Resolved: 2025-04-09

## 2025-04-09 PROCEDURE — 99214 OFFICE O/P EST MOD 30 MIN: CPT | Performed by: STUDENT IN AN ORGANIZED HEALTH CARE EDUCATION/TRAINING PROGRAM

## 2025-04-09 PROCEDURE — 3074F SYST BP LT 130 MM HG: CPT | Performed by: STUDENT IN AN ORGANIZED HEALTH CARE EDUCATION/TRAINING PROGRAM

## 2025-04-09 PROCEDURE — 3078F DIAST BP <80 MM HG: CPT | Performed by: STUDENT IN AN ORGANIZED HEALTH CARE EDUCATION/TRAINING PROGRAM

## 2025-04-09 RX ORDER — HYDROXYZINE HYDROCHLORIDE 10 MG/1
10 TABLET, FILM COATED ORAL NIGHTLY PRN
Qty: 90 TABLET | Refills: 0 | Status: SHIPPED | OUTPATIENT
Start: 2025-04-09

## 2025-04-09 ASSESSMENT — ENCOUNTER SYMPTOMS
CHILLS: 0
SHORTNESS OF BREATH: 0
FEVER: 0
DIZZINESS: 0
HEADACHES: 0

## 2025-04-09 ASSESSMENT — FIBROSIS 4 INDEX: FIB4 SCORE: 2.55

## 2025-04-09 NOTE — PROGRESS NOTES
Subjective:     CC:   Chief Complaint   Patient presents with    Follow-Up       HPI:   Mariusz presents today to go over his lab results and his medications.    Currently taking atorvastatin 40 mg nightly for management of hyperlipidemia.  Overall well-tolerated but he worries the medication may be causing some heartburn.  He has noticed an increase in incidence in heartburn and reflux at night since he started the medication, although he does note that symptoms were present previously as well.  Improves with Tums.  Not occurring every night.    He has struggled with sleep.  This is a chronic issue.  Wakes up 1-2 times at night and has a hard time getting back to sleep.  This has been more pronounced since he started using his CPAP machine for sleep apnea.  Makes it difficult for him to get back to sleep when he does wake up.  Has found benefit in taking Advil PM on occasion.      Past medical, surgical, family, and social history were reviewed and updated.     Medications:    Current Outpatient Medications:     hydrOXYzine HCl (ATARAX) 10 MG Tab, Take 1 Tablet by mouth at bedtime as needed for Itching., Disp: 90 Tablet, Rfl: 0    tamsulosin (FLOMAX) 0.4 MG capsule, Take 1 Capsule by mouth 1/2 hour after breakfast., Disp: 90 Capsule, Rfl: 3    atorvastatin (LIPITOR) 40 MG Tab, Take 1 Tablet by mouth every evening., Disp: 90 Tablet, Rfl: 3    Glucosamine HCl 1500 MG Tab, , Disp: , Rfl:     Probiotic Product (PROBIOTIC PO), Take  by mouth., Disp: , Rfl:     Turmeric (QC TUMERIC COMPLEX PO), Take  by mouth., Disp: , Rfl:     multivitamin Tab, Take 1 Tablet by mouth every day., Disp: , Rfl:     Allergies:  Patient has no known allergies.      Health Maintenance: Completed    ROS:  Review of Systems   Constitutional:  Negative for chills and fever.   Respiratory:  Negative for shortness of breath.    Cardiovascular:  Negative for chest pain.   Neurological:  Negative for dizziness and headaches.       Objective:  "    Exam:  /60 (BP Location: Left arm, Patient Position: Sitting, BP Cuff Size: Adult)   Pulse 67   Temp 36.5 °C (97.7 °F) (Temporal)   Ht 1.905 m (6' 3\")   Wt 104 kg (230 lb)   SpO2 98%   BMI 28.75 kg/m²  Body mass index is 28.75 kg/m².    Physical Exam  Constitutional:       General: He is not in acute distress.  HENT:      Mouth/Throat:      Mouth: Mucous membranes are moist.   Eyes:      Extraocular Movements: Extraocular movements intact.   Pulmonary:      Effort: Pulmonary effort is normal. No respiratory distress.   Musculoskeletal:      Cervical back: Neck supple.   Skin:     Comments: No visible rashes   Neurological:      Mental Status: He is alert.   Psychiatric:         Mood and Affect: Mood normal.         Labs:     Results for orders placed or performed during the hospital encounter of 03/20/25   HEMOGLOBIN A1C    Collection Time: 03/20/25  8:00 AM   Result Value Ref Range    Glycohemoglobin 5.6 4.0 - 5.6 %    Est Avg Glucose 114 mg/dL   Lipid Profile    Collection Time: 03/20/25  8:00 AM   Result Value Ref Range    Cholesterol,Tot 141 100 - 199 mg/dL    Triglycerides 61 0 - 149 mg/dL    HDL 63 >=40 mg/dL    LDL 66 <100 mg/dL   Comp Metabolic Panel    Collection Time: 03/20/25  8:00 AM   Result Value Ref Range    Sodium 138 135 - 145 mmol/L    Potassium 4.2 3.6 - 5.5 mmol/L    Chloride 104 96 - 112 mmol/L    Co2 23 20 - 33 mmol/L    Anion Gap 11.0 7.0 - 16.0    Glucose 100 (H) 65 - 99 mg/dL    Bun 21 8 - 22 mg/dL    Creatinine 0.91 0.50 - 1.40 mg/dL    Calcium 9.0 8.5 - 10.5 mg/dL    Correct Calcium 9.0 8.5 - 10.5 mg/dL    AST(SGOT) 24 12 - 45 U/L    ALT(SGPT) 15 2 - 50 U/L    Alkaline Phosphatase 66 30 - 99 U/L    Total Bilirubin 0.6 0.1 - 1.5 mg/dL    Albumin 4.0 3.2 - 4.9 g/dL    Total Protein 7.0 6.0 - 8.2 g/dL    Globulin 3.0 1.9 - 3.5 g/dL    A-G Ratio 1.3 g/dL   FASTING STATUS    Collection Time: 03/20/25  8:00 AM   Result Value Ref Range    Fasting Status Fasting    ESTIMATED GFR    " Collection Time: 03/20/25  8:00 AM   Result Value Ref Range    GFR (CKD-EPI) 87 >60 mL/min/1.73 m 2           Assessment & Plan:     75 y.o. male with the following -     1. Hypercholesterolemia  Chronic, stable condition.  Lipid panel is within normal limits.  He is currently taking atorvastatin 40 mg nightly.  He is concerned it may be causing side effects of heartburn.  Advised him to stop taking it for 2 weeks to see if the heartburn improves.  If the heartburn does not improve, he can restart the atorvastatin because it is unlikely to be causing his symptoms.  If the heartburn is indeed a side effects, he can try taking the statin in the morning to medicate this side effect.    2. Sleep disturbance  Chronic in nature.  Worse with CPAP.  Wakes up a few times at night and has difficulty getting back to sleep.  Improvement with Advil PM.  Encouraged him to practice good sleep hygiene measures, which were reviewed.  Will trial hydroxyzine 10 mg as needed before bed or when he wakes up if he needs it to get back to sleep.  Should not be taken concurrently with medications that have Benadryl.  - hydrOXYzine HCl (ATARAX) 10 MG Tab; Take 1 Tablet by mouth at bedtime as needed for Itching.  Dispense: 90 Tablet; Refill: 0    3. JOSIAS on CPAP  Chronic, continue CPAP, follow up with sleep medicine as directed.            Return in about 3 months (around 7/9/2025) for follow up sleep, cholesterol.    Please note that this dictation was created using voice recognition software. I have made every reasonable attempt to correct obvious errors, but I expect that there are errors of grammar and possibly content that I did not discover before finalizing the note.

## 2025-05-28 ENCOUNTER — OFFICE VISIT (OUTPATIENT)
Dept: URGENT CARE | Facility: CLINIC | Age: 75
End: 2025-05-28
Payer: MEDICARE

## 2025-05-28 ENCOUNTER — HOSPITAL ENCOUNTER (OUTPATIENT)
Dept: RADIOLOGY | Facility: MEDICAL CENTER | Age: 75
End: 2025-05-28
Attending: NURSE PRACTITIONER
Payer: MEDICARE

## 2025-05-28 ENCOUNTER — RESULTS FOLLOW-UP (OUTPATIENT)
Dept: URGENT CARE | Facility: CLINIC | Age: 75
End: 2025-05-28

## 2025-05-28 VITALS
HEART RATE: 78 BPM | OXYGEN SATURATION: 95 % | BODY MASS INDEX: 28.37 KG/M2 | WEIGHT: 228.2 LBS | TEMPERATURE: 97.2 F | HEIGHT: 75 IN | RESPIRATION RATE: 16 BRPM | DIASTOLIC BLOOD PRESSURE: 70 MMHG | SYSTOLIC BLOOD PRESSURE: 110 MMHG

## 2025-05-28 DIAGNOSIS — M25.462 PAIN AND SWELLING OF LEFT KNEE: ICD-10-CM

## 2025-05-28 DIAGNOSIS — S09.90XA INJURY OF HEAD, INITIAL ENCOUNTER: ICD-10-CM

## 2025-05-28 DIAGNOSIS — M25.562 PAIN AND SWELLING OF LEFT KNEE: ICD-10-CM
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
## 2025-05-28 PROCEDURE — 70450 CT HEAD/BRAIN W/O DYE: CPT

## 2025-05-28 ASSESSMENT — ENCOUNTER SYMPTOMS
RESPIRATORY NEGATIVE: 1
BLURRED VISION: 0
NECK PAIN: 0
MEMORY LOSS: 0
SENSORY CHANGE: 0
DIZZINESS: 0
LOSS OF CONSCIOUSNESS: 0
CARDIOVASCULAR NEGATIVE: 1
WEAKNESS: 0
NAUSEA: 0
VOMITING: 0
SEIZURES: 0
HEADACHES: 0

## 2025-05-28 ASSESSMENT — FIBROSIS 4 INDEX: FIB4 SCORE: 2.55

## 2025-05-28 ASSESSMENT — VISUAL ACUITY: OU: 1

## 2025-05-28 NOTE — PROGRESS NOTES
Subjective:     Mariusz Jackson is a 75 y.o. male who presents for Knee Pain (Knee swelling on the left side, does not recall injury but has been gardening on Sunday ) and Fall (Due to his knee had a fall last night and hit his head on his forehead, possible bump )       Knee Pain  This is a new problem. Associated symptoms include a rash. Pertinent negatives include no headaches, nausea, neck pain, vomiting or weakness.   Fall  Pertinent negatives include no headaches, loss of consciousness, nausea or vomiting.     Wife present also provides history.    Sunday, patient was gardening.  Reports he was squatting down with his left knee bent longer than usual.  Could feel the increase in resistance there.  The following day, started to experience swelling, pain, tenderness at the anterolateral aspect above the knee.  Reports past history of arthritis of the knee.  Has been using white willow bark with some improvement in symptoms.    Last night around 11 PM, patient reports his knee symptoms caused him to experience a fall.  Reports striking the front of his head against the corner of a wall.  Has abrasions and skin tears at the left forehead and right upper forehead.  Denies symptoms.  Not on blood thinners.    Review of Systems   Eyes:  Negative for blurred vision.   Respiratory: Negative.     Cardiovascular: Negative.    Gastrointestinal:  Negative for nausea and vomiting.   Musculoskeletal:  Negative for neck pain.        Left knee pain, swelling   Skin:  Positive for rash.   Neurological:  Negative for dizziness, sensory change, seizures, loss of consciousness, weakness and headaches.   Psychiatric/Behavioral:  Negative for memory loss.    All other systems reviewed and are negative.    Refer to HPI for additional details.    During this visit, appropriate PPE was worn, and hand hygiene was performed.    PMH:  has a past medical history of Apnea, sleep, Daytime sleepiness, and Insomnia.    MEDS: Current  "Medications[1]    ALLERGIES: Allergies[2]  SURGHX: Past Surgical History[3]  SOCHX:  reports that he has never smoked. He has never used smokeless tobacco. He reports current alcohol use. He reports that he does not use drugs.    FH: Per HPI as applicable/pertinent.      Objective:     /70 (BP Location: Left arm, Patient Position: Sitting, BP Cuff Size: Adult)   Pulse 78   Temp 36.2 °C (97.2 °F) (Temporal)   Resp 16   Ht 1.905 m (6' 3\")   Wt 104 kg (228 lb 3.2 oz)   SpO2 95%   BMI 28.52 kg/m²     Physical Exam  Nursing note reviewed.   Constitutional:       General: He is not in acute distress.     Appearance: He is well-developed. He is not ill-appearing or toxic-appearing.   HENT:      Head: Normocephalic. Abrasion (With skin tears and skin loss) present. No raccoon eyes, Hare's sign or contusion (No bony depression, no tenderness).     Eyes:      General: Vision grossly intact.      Extraocular Movements: Extraocular movements intact.      Pupils: Pupils are equal, round, and reactive to light.   Cardiovascular:      Rate and Rhythm: Normal rate.   Pulmonary:      Effort: Pulmonary effort is normal. No respiratory distress.   Musculoskeletal:         General: No deformity.      Right hand: Normal strength.      Left hand: Normal strength.      Cervical back: Normal range of motion. No swelling, deformity or tenderness. No spinous process tenderness. Normal range of motion.      Right knee: No swelling.      Left knee: Swelling present. No deformity, erythema, ecchymosis or lacerations. Normal range of motion. Tenderness (Anterolateral) present. No LCL laxity, MCL laxity, ACL laxity or PCL laxity.Normal alignment and normal patellar mobility.   Skin:     General: Skin is warm and dry.      Coloration: Skin is not pale.      Findings: No erythema.   Neurological:      Mental Status: He is alert and oriented to person, place, and time.      GCS: GCS eye subscore is 4. GCS verbal subscore is 5. GCS " motor subscore is 6.      Cranial Nerves: No dysarthria or facial asymmetry.      Motor: No weakness.   Psychiatric:         Mood and Affect: Mood normal.         Behavior: Behavior normal. Behavior is cooperative.         Thought Content: Thought content normal.         Judgment: Judgment normal.       Assessment/Plan:     1. Fall, initial encounter    2. Injury of head, initial encounter  - CT-HEAD W/O; Future    3. Pain and swelling of left knee    Wound care performed abrasions with chlorhexidine solution.  Irrigated with NS.  Devitalized tissue debrided.  Nonstick dressing applied after applying Polysporin.    Advised to continue with basic wound care at home and continuing with antibiotic ointment, provided.    [facility]    Probable strain of left knee from garden work on Sunday.  Patient reports prior history of arthritis.  Symptoms likely multifactorial.  Advised supportive care including rest, ice 20 minutes at a time, and elevation.  OTC analgesics/anti-inflammatories as needed per 's instructions.  Recommend soft knee support.  Imaging and referral to Ortho deferred at this time.  Patient will follow-up with PCP.    Differential diagnosis, natural history, supportive care, over-the-counter symptom management per 's instructions, close monitoring, and indications for immediate follow-up discussed.     All questions answered. Patient/wife agrees with the plan of care.    Discharge summary provided via [employer].       [1]   Current Outpatient Medications:     hydrOXYzine HCl (ATARAX) 10 MG Tab, Take 1 Tablet by mouth at bedtime as needed for Itching., Disp: 90 Tablet, Rfl: 0    tamsulosin (FLOMAX) 0.4 MG capsule, Take 1 Capsule by mouth 1/2 hour after breakfast., Disp: 90 Capsule, Rfl: 3    atorvastatin (LIPITOR) 40 MG Tab, Take 1 Tablet by mouth every evening., Disp: 90 Tablet, Rfl: 3    Glucosamine HCl 1500 MG Tab, , Disp: , Rfl:     Probiotic Product (PROBIOTIC PO),  Take  by mouth., Disp: , Rfl:     Turmeric (QC TUMERIC COMPLEX PO), Take  by mouth., Disp: , Rfl:     multivitamin Tab, Take 1 Tablet by mouth every day., Disp: , Rfl:   [2] No Known Allergies  [3] History reviewed. No pertinent surgical history.

## 2025-06-03 DIAGNOSIS — R35.1 NOCTURIA: ICD-10-CM

## 2025-06-03 RX ORDER — TAMSULOSIN HYDROCHLORIDE 0.4 MG/1
0.4 CAPSULE ORAL
Qty: 90 CAPSULE | Refills: 3 | Status: SHIPPED | OUTPATIENT
Start: 2025-06-03

## 2025-06-03 NOTE — TELEPHONE ENCOUNTER
Received request via: Patient    Was the patient seen in the last year in this department? Yes    Does the patient have an active prescription (recently filled or refills available) for medication(s) requested? No    Pharmacy Name:     Does the patient have nursing home Plus and need 100-day supply? (This applies to ALL medications) Patient does not have SCP

## 2025-06-04 ENCOUNTER — OFFICE VISIT (OUTPATIENT)
Dept: SPORTS MEDICINE | Facility: OTHER | Age: 75
End: 2025-06-04
Payer: MEDICARE

## 2025-06-04 VITALS
BODY MASS INDEX: 28.37 KG/M2 | OXYGEN SATURATION: 95 % | SYSTOLIC BLOOD PRESSURE: 122 MMHG | HEIGHT: 75 IN | WEIGHT: 228.2 LBS | HEART RATE: 85 BPM | TEMPERATURE: 98.3 F | RESPIRATION RATE: 16 BRPM | DIASTOLIC BLOOD PRESSURE: 70 MMHG

## 2025-06-04 DIAGNOSIS — M25.562 ACUTE PAIN OF LEFT KNEE: Primary | ICD-10-CM

## 2025-06-04 DIAGNOSIS — M17.12 PRIMARY OSTEOARTHRITIS OF LEFT KNEE: ICD-10-CM

## 2025-06-04 PROCEDURE — 3078F DIAST BP <80 MM HG: CPT | Performed by: FAMILY MEDICINE

## 2025-06-04 PROCEDURE — 3074F SYST BP LT 130 MM HG: CPT | Performed by: FAMILY MEDICINE

## 2025-06-04 PROCEDURE — 20610 DRAIN/INJ JOINT/BURSA W/O US: CPT | Mod: LT | Performed by: FAMILY MEDICINE

## 2025-06-04 RX ORDER — TRIAMCINOLONE ACETONIDE 40 MG/ML
40 INJECTION, SUSPENSION INTRA-ARTICULAR; INTRAMUSCULAR ONCE
Status: COMPLETED | OUTPATIENT
Start: 2025-06-04 | End: 2025-06-04

## 2025-06-04 RX ADMIN — TRIAMCINOLONE ACETONIDE 40 MG: 40 INJECTION, SUSPENSION INTRA-ARTICULAR; INTRAMUSCULAR at 09:09

## 2025-06-04 ASSESSMENT — ENCOUNTER SYMPTOMS
TINGLING: 0
SENSORY CHANGE: 0

## 2025-06-04 ASSESSMENT — FIBROSIS 4 INDEX: FIB4 SCORE: 2.55

## 2025-06-04 NOTE — PROGRESS NOTES
"Subjective:     Mraiusz Jackson is a 75 y.o. male who presents for Knee Pain (L knee pain )    HPI  Pt presents for evaluation of an acute problem  Pt with recurrent left knee pain and known osteoarthritis   Has had increased pain the past 10 days after gardening   Has had some swelling and stiffness   No recent falls or injury  Pain is more in the anterior knee  Pain is worse with ambulation  No radiating pain  No numbness or tingling    Review of Systems   Skin:  Negative for rash.   Neurological:  Negative for tingling and sensory change.     PMH:  has a past medical history of Apnea, sleep, Daytime sleepiness, and Insomnia.  MEDS: Current Medications[1]  ALLERGIES: Allergies[2]  SURGHX: Past Surgical History[3]  SOCHX:  reports that he has never smoked. He has never used smokeless tobacco. He reports current alcohol use. He reports that he does not use drugs.     Objective:   /70 (BP Location: Left arm, Patient Position: Sitting, BP Cuff Size: Adult)   Pulse 85   Temp 36.8 °C (98.3 °F) (Temporal)   Resp 16   Ht 1.905 m (6' 3\")   Wt 104 kg (228 lb 3.2 oz)   SpO2 95%   BMI 28.52 kg/m²     Physical Exam  Constitutional:       General: He is not in acute distress.     Appearance: He is well-developed. He is not diaphoretic.   Pulmonary:      Effort: Pulmonary effort is normal.   Neurological:      Mental Status: He is alert.     Left knee  Appearance - No bruising, erythema, or deformity appreciated  Palpation - +TTP along joint lines  ROM - FROM with moderate crepitus  Strength - 5/5 throughout  Neuro - Sensation intact  Special testing - No laxity or pain with varus/valgus stress, neg anterior drawer, neg posterior drawer, neg Lachman's    Knee injection   First, a verbal consent and a verbal time-out were done, explaining the risks and benefits of the procedure. Then the patient was placed in a seated position.  Anterior lateral joint line portals were identified. The skin was cleaned with alcohol " and the clean, no touch technique was used with a 25-gauge 1.5-inch needle. A combination of 5 mL of 1% lidocaine without epinephrine and 1 milliliter of Kenalog 40 mg/mL was injected into the left knee. The patient tolerated the procedure well and there were no immediate post injection complications. Hemostasis was then achieved with gentle pressure and a Band-Aid was placed over the lesion.  Post injection instructions for range of motion, ice, activity modification, signs of infection, emergency precautions were discussed with the patient.    Assessment/Plan:   Assessment    1. Acute pain of left knee    2. Primary osteoarthritis of left knee    Other orders  - triamcinolone acetonide (Kenalog-40) injection 40 mg    Patient with osteoarthritis of the left knee.  Suspect that this is the main pain generator at this time.  Reviewed treatment approaches including therapeutic exercise, topical medications, oral medications, bracing, and patient preferred to have corticosteroid injection done today.  As above, injection tolerated well with no acute complication.  All questions answered and will follow-up whenever he feels the injection is wearing off         [1]   Current Outpatient Medications:     tamsulosin (FLOMAX) 0.4 MG capsule, Take 1 Capsule by mouth 1/2 hour after breakfast., Disp: 90 Capsule, Rfl: 3    hydrOXYzine HCl (ATARAX) 10 MG Tab, Take 1 Tablet by mouth at bedtime as needed for Itching., Disp: 90 Tablet, Rfl: 0    atorvastatin (LIPITOR) 40 MG Tab, Take 1 Tablet by mouth every evening., Disp: 90 Tablet, Rfl: 3    Glucosamine HCl 1500 MG Tab, , Disp: , Rfl:     Probiotic Product (PROBIOTIC PO), Take  by mouth., Disp: , Rfl:     Turmeric (QC TUMERIC COMPLEX PO), Take  by mouth., Disp: , Rfl:     multivitamin Tab, Take 1 Tablet by mouth every day., Disp: , Rfl:   [2] No Known Allergies  [3] No past surgical history on file.

## 2025-07-15 ENCOUNTER — OFFICE VISIT (OUTPATIENT)
Dept: MEDICAL GROUP | Facility: PHYSICIAN GROUP | Age: 75
End: 2025-07-15
Payer: MEDICARE

## 2025-07-15 VITALS
HEART RATE: 67 BPM | DIASTOLIC BLOOD PRESSURE: 62 MMHG | HEIGHT: 75 IN | OXYGEN SATURATION: 93 % | TEMPERATURE: 98.8 F | SYSTOLIC BLOOD PRESSURE: 118 MMHG | BODY MASS INDEX: 27.85 KG/M2 | WEIGHT: 224 LBS

## 2025-07-15 DIAGNOSIS — E78.00 HYPERCHOLESTEROLEMIA: ICD-10-CM

## 2025-07-15 DIAGNOSIS — M17.12 PRIMARY OSTEOARTHRITIS OF LEFT KNEE: ICD-10-CM

## 2025-07-15 DIAGNOSIS — G47.9 SLEEP DISTURBANCE: ICD-10-CM

## 2025-07-15 PROCEDURE — 3078F DIAST BP <80 MM HG: CPT | Performed by: STUDENT IN AN ORGANIZED HEALTH CARE EDUCATION/TRAINING PROGRAM

## 2025-07-15 PROCEDURE — 3074F SYST BP LT 130 MM HG: CPT | Performed by: STUDENT IN AN ORGANIZED HEALTH CARE EDUCATION/TRAINING PROGRAM

## 2025-07-15 PROCEDURE — 99214 OFFICE O/P EST MOD 30 MIN: CPT | Performed by: STUDENT IN AN ORGANIZED HEALTH CARE EDUCATION/TRAINING PROGRAM

## 2025-07-15 RX ORDER — HYDROXYZINE HYDROCHLORIDE 25 MG/1
25 TABLET, FILM COATED ORAL 3 TIMES DAILY PRN
Qty: 90 TABLET | Refills: 0 | Status: SHIPPED | OUTPATIENT
Start: 2025-07-15

## 2025-07-15 ASSESSMENT — ENCOUNTER SYMPTOMS
HEADACHES: 0
DIZZINESS: 0
FEVER: 0
SHORTNESS OF BREATH: 0
CHILLS: 0

## 2025-07-15 ASSESSMENT — FIBROSIS 4 INDEX: FIB4 SCORE: 2.55

## 2025-07-15 NOTE — PROGRESS NOTES
Verbal consent was acquired by the patient to use [employer] ambient listening note generation during this visit.    Subjective:     HPI:   History of Present Illness  The patient presents for evaluation of hypercholesterolemia, left knee pain, and sleep issues.    Heartburn  He was previously seen for heartburn, which was suspected to be a side effect of his statin medication. However, he could not definitively determine if the statin was the cause as the heartburn persisted intermittently, albeit less frequently. He has not resumed the statin medication. The heartburn typically occurs shortly after he goes to bed, a few nights a week. His dinner is usually around 6 PM and bedtime around 10 PM. He manages the heartburn with Tums, which provides relief.    Left Knee Pain  He has been experiencing issues with both knees over the past few years. Symptoms improved last year with physical therapy. His condition improved significantly until two months ago when he woke up with severe pain in his left knee, making it difficult to get out of bed. The pain subsided enough after a few days to allow him to walk, but it persisted. He consulted a sports medicine doctor who noted swelling in the knee. He received a steroid injection on 06/04/2025, which provided relief for a couple of weeks. Unfortunately, the pain is still quite significant. Currently, his knee is swollen. He applies ice and uses topical Voltaren gel. He has never undergone surgery on either knee and prefers to avoid it. He does not take any oral medications for the pain and has modified his activities to avoid stressing the knee. He can walk but finds descending hills challenging.     Sleep Issues  Continues to struggle with difficulty falling asleep.  Has tried hydroxyzine at a dose of 10 mg at night which he finds somewhat beneficial but some nights are better than others.  He has also taken an over-the-counter herbal supplement for sleep which does provide  "some benefit as well.  He notes that the CPAP makes sleep a little difficult.          Past medical, surgical, family, and social history were reviewed and updated.     Medications:  Current Medications[1]    Allergies:  Patient has no known allergies.      Health Maintenance: Completed    ROS:  Review of Systems   Constitutional:  Negative for chills and fever.   Respiratory:  Negative for shortness of breath.    Cardiovascular:  Negative for chest pain.   Neurological:  Negative for dizziness and headaches.       Objective:     Exam:  /62 (BP Location: Left arm, Patient Position: Sitting, BP Cuff Size: Adult)   Pulse 67   Temp 37.1 °C (98.8 °F) (Temporal)   Ht 1.905 m (6' 3\")   Wt 102 kg (224 lb)   SpO2 93%   BMI 28.00 kg/m²  Body mass index is 28 kg/m².    Physical Exam  Constitutional:       General: He is not in acute distress.  HENT:      Mouth/Throat:      Mouth: Mucous membranes are moist.   Eyes:      Extraocular Movements: Extraocular movements intact.   Cardiovascular:      Rate and Rhythm: Normal rate and regular rhythm.      Heart sounds: No murmur heard.     No friction rub. No gallop.   Pulmonary:      Effort: Pulmonary effort is normal.      Breath sounds: No wheezing, rhonchi or rales.   Musculoskeletal:      Cervical back: Neck supple.   Skin:     General: Skin is warm and dry.   Neurological:      Mental Status: He is alert.   Psychiatric:         Mood and Affect: Mood normal.         Results      Assessment & Plan:     1. Hypercholesterolemia        2. Primary osteoarthritis of left knee        3. Sleep disturbance  hydrOXYzine HCl (ATARAX) 25 MG Tab          Assessment & Plan  1. Hypercholesterolemia: Chronic.  - Stopped atorvastatin to see if it could have been causing heartburn, but occasional heartburn has persisted  - Restart atorvastatin 40 mg nightly  - Monitor for side effects    2. Left knee pain: Acute on chronic. X-rays have confirmed osteoarthritis, likely contributing to " current pain.  - Underwent corticosteroid injection 06/04/2025, reports mild improvement but overall continues to have significant pain  - Considered MRI but patient would like to hold off  - Consider PT and/or orthopedics consultation, but patient would like to hold off  - Declines prescription for meloxicam, prefers to use topical medication  - Continue conservative therapy for now    3. Sleep issues: Chronic in nature.  - Emphasized importance of good sleep hygiene practices  - Has found modest benefit with hydroxyzine, may increase dose to 25 mg nightly as needed  - Consider alternative sleep aids in the future if needed      Follow-up  - Follow-up scheduled in 3 months or sooner if necessary.          Please note that this dictation was created using voice recognition software. I have made every reasonable attempt to correct obvious errors, but I expect that there are errors of grammar and possibly content that I did not discover before finalizing the note.             [1]   Current Outpatient Medications:     hydrOXYzine HCl (ATARAX) 25 MG Tab, Take 1 Tablet by mouth 3 times a day as needed for Itching., Disp: 90 Tablet, Rfl: 0    tamsulosin (FLOMAX) 0.4 MG capsule, Take 1 Capsule by mouth 1/2 hour after breakfast., Disp: 90 Capsule, Rfl: 3    atorvastatin (LIPITOR) 40 MG Tab, Take 1 Tablet by mouth every evening., Disp: 90 Tablet, Rfl: 3    Glucosamine HCl 1500 MG Tab, , Disp: , Rfl:     Probiotic Product (PROBIOTIC PO), Take  by mouth., Disp: , Rfl:     Turmeric (QC TUMERIC COMPLEX PO), Take  by mouth., Disp: , Rfl:     multivitamin Tab, Take 1 Tablet by mouth every day., Disp: , Rfl:

## 2025-07-29 ENCOUNTER — OFFICE VISIT (OUTPATIENT)
Dept: SLEEP MEDICINE | Facility: MEDICAL CENTER | Age: 75
End: 2025-07-29
Attending: STUDENT IN AN ORGANIZED HEALTH CARE EDUCATION/TRAINING PROGRAM
Payer: MEDICARE

## 2025-07-29 VITALS
OXYGEN SATURATION: 96 % | HEIGHT: 75 IN | BODY MASS INDEX: 27.98 KG/M2 | SYSTOLIC BLOOD PRESSURE: 108 MMHG | RESPIRATION RATE: 16 BRPM | WEIGHT: 225 LBS | HEART RATE: 84 BPM | DIASTOLIC BLOOD PRESSURE: 74 MMHG

## 2025-07-29 DIAGNOSIS — G47.00 ACUTE INSOMNIA: ICD-10-CM

## 2025-07-29 DIAGNOSIS — G47.33 OSA (OBSTRUCTIVE SLEEP APNEA): Primary | ICD-10-CM

## 2025-07-29 PROCEDURE — 99214 OFFICE O/P EST MOD 30 MIN: CPT | Performed by: STUDENT IN AN ORGANIZED HEALTH CARE EDUCATION/TRAINING PROGRAM

## 2025-07-29 PROCEDURE — 3078F DIAST BP <80 MM HG: CPT | Performed by: STUDENT IN AN ORGANIZED HEALTH CARE EDUCATION/TRAINING PROGRAM

## 2025-07-29 PROCEDURE — 3074F SYST BP LT 130 MM HG: CPT | Performed by: STUDENT IN AN ORGANIZED HEALTH CARE EDUCATION/TRAINING PROGRAM

## 2025-07-29 ASSESSMENT — FIBROSIS 4 INDEX: FIB4 SCORE: 2.55

## 2025-07-29 NOTE — PROGRESS NOTES
[facility] Follow-up Visit    CC: Variation in sleep quality      HPI:  Mariusz Jackson is a 75 y.o.male  with BPH, hyperlipidemia, and obstructive sleep apnea on CPAP.  Presents today to follow-up regarding management obstructive sleep apnea to discuss sleep.    He reports his CPAP machine seems to be working okay.  He has no acute complaints regarding the mask pressure or the mask itself.    He has been noticing variability in his sleep quality.  There does not appear to be a pattern he can determine or exacerbating factors.  On some nights he will get quality sleep and other nights less quality.  He is tracking with his wearable smart watch as far as sleep quality and sleep time.    He has been having some increased external life stressors.  He feels that this is impacting his sleep as well.  He has noticed that he has been laying awake more at night.  He will lay awake with the machine on at night and in the morning.    SL: 15-60 min, 3-4 days >30min   Bedtime before 10pm   Awake time 6am  Awakenings 2-3  How lon-3 times a week > 30min.       DME provider: [facility]   Device: Airsense 10   Mask: hybrid fullface top connecting   Aerophagia: No   Snoring: No   Dry mouth: sometimes   Leak: can happen in the night  Skin irritation: No   Chin strap: No      Sleep History  2024 HST showed severe obstructive sleep apnea with an overall 4% AHI of 38 events an hour, RDI of 54 events an hour, time at or below 88% saturation of 6.6 minutes.    Patient Active Problem List    Diagnosis Date Noted    JOSIAS on CPAP 2025    Nocturia 2024    Chronic pain of both knees 2023    Bilateral carpal tunnel syndrome 2023    Other male erectile dysfunction 2022    Hypercholesterolemia 2022    History of laminectomy 10/19/2021    Chronic lumbar pain 2019       Past Medical History[1]     Past Surgical History[2]    No family history on file.    Social History     Socioeconomic History     Marital status:      Spouse name: Not on file    Number of children: Not on file    Years of education: Not on file    Highest education level: Doctorate   Occupational History    Not on file   Tobacco Use    Smoking status: Never    Smokeless tobacco: Never   Vaping Use    Vaping status: Never Used   Substance and Sexual Activity    Alcohol use: Yes     Comment: occ    Drug use: Never    Sexual activity: Yes     Partners: Female     Birth control/protection: None     Comment: 1 partner    Other Topics Concern    Not on file   Social History Narrative    Not on file     Social Drivers of Health     Financial Resource Strain: Low Risk  (6/17/2024)    Overall Financial Resource Strain (CARDIA)     Difficulty of Paying Living Expenses: Not hard at all   Food Insecurity: No Food Insecurity (6/17/2024)    Hunger Vital Sign     Worried About Running Out of Food in the Last Year: Never true     Ran Out of Food in the Last Year: Never true   Transportation Needs: No Transportation Needs (6/17/2024)    PRAPARE - Transportation     Lack of Transportation (Medical): No     Lack of Transportation (Non-Medical): No   Physical Activity: Insufficiently Active (6/17/2024)    Exercise Vital Sign     Days of Exercise per Week: 4 days     Minutes of Exercise per Session: 30 min   Stress: No Stress Concern Present (6/17/2024)    [employer] - Occupational Stress Questionnaire     Feeling of Stress : Only a little   Social Connections: Moderately Integrated (6/17/2024)    Social Connection and Isolation Panel [NHANES]     Frequency of Communication with Friends and Family: Three times a week     Frequency of Social Gatherings with Friends and Family: Twice a week     Attends Mu-ism Services: Never     Active Member of Clubs or Organizations: Yes     Attends Club or Organization Meetings: More than 4 times per year     Marital Status:    Intimate Partner Violence: Unknown (11/10/2022)     "Received from [employer]    Intimate Partner Violence     Fear of Current or Ex-Partner: Not on file     Emotionally Abused: Not on file     Physically Abused: Not on file     Sexually Abused: Not on file     Questions Apply to Other Individual: Not on file   Housing Stability: Unknown (6/17/2024)    Housing Stability Vital Sign     Unable to Pay for Housing in the Last Year: No     Number of Places Lived in the Last Year: Not on file     Unstable Housing in the Last Year: No       Current Medications[3]     ALLERGIES: Patient has no known allergies.    ROS  Constitutional: Denies fevers, Denies weight changes  Ears/Nose/Throat/Mouth: Denies nasal congestion or sore throat   Cardiovascular: Denies chest pain  Respiratory: Denies shortness of breath, Denies cough  Gastrointestinal/Hepatic: Denies nausea, vomiting  Sleep: see HPI      PHYSICAL EXAM  /74 (BP Location: Left arm, Patient Position: Sitting, BP Cuff Size: Adult)   Pulse 84   Resp 16   Ht 1.905 m (6' 3\")   Wt 102 kg (225 lb)   SpO2 96%   BMI 28.12 kg/m²   Appearance: Well-nourished, well-developed, no acute distress  Eyes:  No scleral icterus , EOMI  Musculoskeletal:  Grossly normal; gait and station normal; digits and nails normal  Skin:  No rashes, petechiae, cyanosis  Neurologic: without focal signs; oriented to person, time, place, and purpose; judgement intact      Medical Decision Making   Assessment and Plan  Mariusz Jackson is a 75 y.o.male  with BPH, hyperlipidemia, and obstructive sleep apnea on CPAP.  Presents today to follow-up regarding management obstructive sleep apnea to discuss sleep.    The medical record was reviewed.    Obstructive sleep apnea  Compliance data reviewed showing 93% usage > 4hours in last 30  days. Average AHI 7.0 events/hour. Pt continues to use and benefit from machine.      Current Settings auto CPAP 5-15    Machine is reporting variation in events from night to night.  There are some nights with " good control of sleep apnea and other nights with elevated central respiratory events.  On average she is having AHI of 7 and 5.9 of that is central in nature.  Patient is laying awake on the machine.  Advised that laying awake on the machine may cause the machine to report central events when there are not true events.    Discussed potential of going to the sleep lab for a PSG titration study to confirm presence of central apnea or not.  Also discussed potential of having a sleep log to compare good nights versus bad nights with his data.  Advised for him to monitor a good night and if he laid awake on the machine and for how long.  Recommended he keep a 1 to 2-week log and message us with this log to compare dated the machine is reporting.    PLAN:   - May consider PSG titration study if events do not normalize  -Patient to start home sleep log regarding good nights versus bad nights  -Advised to reach out via "[employer]hart with questions     Has been advised to continue the current CPAP, clean equipment frequently, and get new mask and supplies as allowed by insurance and DME. Recommend an earlier appointment, if significant treatment barriers develop.    Patients with JOSIAS are at increased risk of cardiovascular disease including coronary artery disease, systemic arterial hypertension, pulmonary arterial hypertension, cardiac arrythmias, and stroke.    Acute insomnia  Patient does report difficulty with sleep initiation and sleep maintenance.  This been happening for approximately 2 months.  Advised if he goes longer than 3 months he will meet criteria for chronic insomnia.  One of the main stressors is his son moving to [place].  He states that his son is moving next week.  He hopes that after the move his stress level will be decreased.  Discussed cognitive behavioral therapy for insomnia.  Advised that if he continues to have difficulty sleeping he may benefit from meeting with a sleep psychologist and undergoing a  CBT-I program.    Discussed stimulus control.  Advised for patient not to lay in bed longer than 1 hour if he is unable to sleep.    Return in about 3 months (around 10/29/2025).      Please note portions of this record was created using voice recognition software. I have made every reasonable attempt to correct obvious errors, but I expect that there are errors of grammar and possibly content I did not discover before finalizing the note.           [1]   Past Medical History:  Diagnosis Date    Apnea, sleep     Daytime sleepiness     Insomnia    [2] No past surgical history on file.  [3]   Current Outpatient Medications   Medication Sig Dispense Refill    hydrOXYzine HCl (ATARAX) 25 MG Tab Take 1 Tablet by mouth 3 times a day as needed for Itching. 90 Tablet 0    tamsulosin (FLOMAX) 0.4 MG capsule Take 1 Capsule by mouth 1/2 hour after breakfast. 90 Capsule 3    atorvastatin (LIPITOR) 40 MG Tab Take 1 Tablet by mouth every evening. 90 Tablet 3    Glucosamine HCl 1500 MG Tab       Probiotic Product (PROBIOTIC PO) Take  by mouth.      Turmeric (QC TUMERIC COMPLEX PO) Take  by mouth.      multivitamin Tab Take 1 Tablet by mouth every day.       No current facility-administered medications for this visit.

## 2025-08-05 DIAGNOSIS — E78.00 HYPERCHOLESTEROLEMIA: ICD-10-CM

## 2025-08-05 DIAGNOSIS — R93.1 ELEVATED CORONARY ARTERY CALCIUM SCORE: ICD-10-CM

## 2025-08-05 RX ORDER — ATORVASTATIN CALCIUM 40 MG/1
40 TABLET, FILM COATED ORAL NIGHTLY
Qty: 90 TABLET | Refills: 3 | Status: SHIPPED | OUTPATIENT
Start: 2025-08-05